# Patient Record
Sex: FEMALE | Race: WHITE
[De-identification: names, ages, dates, MRNs, and addresses within clinical notes are randomized per-mention and may not be internally consistent; named-entity substitution may affect disease eponyms.]

---

## 2018-01-27 ENCOUNTER — HOSPITAL ENCOUNTER (INPATIENT)
Dept: HOSPITAL 36 - ER | Age: 83
LOS: 5 days | Discharge: HOME | DRG: 871 | End: 2018-02-01
Payer: MEDICARE

## 2018-01-27 VITALS — SYSTOLIC BLOOD PRESSURE: 97 MMHG | DIASTOLIC BLOOD PRESSURE: 51 MMHG

## 2018-01-27 DIAGNOSIS — Z93.1: ICD-10-CM

## 2018-01-27 DIAGNOSIS — R65.21: ICD-10-CM

## 2018-01-27 DIAGNOSIS — M81.0: ICD-10-CM

## 2018-01-27 DIAGNOSIS — J96.00: ICD-10-CM

## 2018-01-27 DIAGNOSIS — I25.10: ICD-10-CM

## 2018-01-27 DIAGNOSIS — E03.9: ICD-10-CM

## 2018-01-27 DIAGNOSIS — E78.5: ICD-10-CM

## 2018-01-27 DIAGNOSIS — E44.1: ICD-10-CM

## 2018-01-27 DIAGNOSIS — Z90.49: ICD-10-CM

## 2018-01-27 DIAGNOSIS — E83.51: ICD-10-CM

## 2018-01-27 DIAGNOSIS — E11.65: ICD-10-CM

## 2018-01-27 DIAGNOSIS — F39: ICD-10-CM

## 2018-01-27 DIAGNOSIS — E11.649: ICD-10-CM

## 2018-01-27 DIAGNOSIS — A41.9: Primary | ICD-10-CM

## 2018-01-27 DIAGNOSIS — D63.8: ICD-10-CM

## 2018-01-27 DIAGNOSIS — E87.6: ICD-10-CM

## 2018-01-27 DIAGNOSIS — Z86.73: ICD-10-CM

## 2018-01-27 DIAGNOSIS — G30.9: ICD-10-CM

## 2018-01-27 DIAGNOSIS — I11.9: ICD-10-CM

## 2018-01-27 DIAGNOSIS — E87.0: ICD-10-CM

## 2018-01-27 DIAGNOSIS — Z74.01: ICD-10-CM

## 2018-01-27 DIAGNOSIS — K25.4: ICD-10-CM

## 2018-01-27 DIAGNOSIS — G93.49: ICD-10-CM

## 2018-01-27 DIAGNOSIS — F02.80: ICD-10-CM

## 2018-01-27 DIAGNOSIS — G93.41: ICD-10-CM

## 2018-01-27 DIAGNOSIS — Z88.6: ICD-10-CM

## 2018-01-27 DIAGNOSIS — E86.0: ICD-10-CM

## 2018-01-27 DIAGNOSIS — K26.4: ICD-10-CM

## 2018-01-27 DIAGNOSIS — Z88.5: ICD-10-CM

## 2018-01-27 DIAGNOSIS — C43.9: ICD-10-CM

## 2018-01-27 DIAGNOSIS — S31.000A: ICD-10-CM

## 2018-01-27 DIAGNOSIS — J69.0: ICD-10-CM

## 2018-01-27 DIAGNOSIS — L89.152: ICD-10-CM

## 2018-01-27 DIAGNOSIS — M62.81: ICD-10-CM

## 2018-01-27 DIAGNOSIS — K56.41: ICD-10-CM

## 2018-01-27 DIAGNOSIS — F41.9: ICD-10-CM

## 2018-01-27 DIAGNOSIS — R13.10: ICD-10-CM

## 2018-01-27 DIAGNOSIS — G82.20: ICD-10-CM

## 2018-01-27 LAB
ALBUMIN SERPL-MCNC: 3.3 GM/DL (ref 3.7–5.3)
ALBUMIN SERPL-MCNC: 3.5 GM/DL (ref 3.7–5.3)
ALBUMIN/GLOB SERPL: 1.2 {RATIO} (ref 1–1.8)
ALBUMIN/GLOB SERPL: 1.3 {RATIO} (ref 1–1.8)
ALP SERPL-CCNC: 131 U/L (ref 34–104)
ALP SERPL-CCNC: 134 U/L (ref 34–104)
ALT SERPL-CCNC: 36 U/L (ref 7–52)
ALT SERPL-CCNC: 37 U/L (ref 7–52)
ANION GAP SERPL CALC-SCNC: 10.8 MMOL/L (ref 7–16)
ANION GAP SERPL CALC-SCNC: 15.8 MMOL/L (ref 7–16)
APPEARANCE UR: CLEAR
AST SERPL-CCNC: 43 U/L (ref 13–39)
AST SERPL-CCNC: 46 U/L (ref 13–39)
BACTERIA #/AREA URNS HPF: (no result) /HPF
BASOPHILS # BLD AUTO: 0 TH/CUMM (ref 0–0.2)
BASOPHILS # BLD AUTO: 0 TH/CUMM (ref 0–0.2)
BASOPHILS NFR BLD AUTO: 0.1 % (ref 0–2)
BASOPHILS NFR BLD AUTO: 0.1 % (ref 0–2)
BILIRUB DIRECT SERPL-MCNC: 0.1 MG/DL (ref 0–0.2)
BILIRUB SERPL-MCNC: 0.3 MG/DL (ref 0.3–1)
BILIRUB SERPL-MCNC: 0.3 MG/DL (ref 0.3–1)
BILIRUB UR-MCNC: NEGATIVE MG/DL
BUN SERPL-MCNC: 63 MG/DL (ref 7–25)
BUN SERPL-MCNC: 91 MG/DL (ref 7–25)
CALCIUM SERPL-MCNC: 10.1 MG/DL (ref 8.6–10.3)
CALCIUM SERPL-MCNC: 7.8 MG/DL (ref 8.6–10.3)
CHLORIDE SERPL-SCNC: 109 MEQ/L (ref 98–107)
CHLORIDE SERPL-SCNC: 117 MEQ/L (ref 98–107)
CHOLEST SERPL-MCNC: 96 MG/DL (ref ?–200)
CO2 SERPL-SCNC: 23.8 MEQ/L (ref 21–31)
CO2 SERPL-SCNC: 30.1 MEQ/L (ref 21–31)
COLOR UR: YELLOW
CREAT SERPL-MCNC: 0.7 MG/DL (ref 0.6–1.2)
CREAT SERPL-MCNC: 0.9 MG/DL (ref 0.6–1.2)
EOSINOPHIL # BLD AUTO: 0.1 TH/CMM (ref 0.1–0.4)
EOSINOPHIL # BLD AUTO: 0.1 TH/CMM (ref 0.1–0.4)
EOSINOPHIL NFR BLD AUTO: 0.9 % (ref 0–5)
EOSINOPHIL NFR BLD AUTO: 2.3 % (ref 0–5)
EPI CELLS URNS QL MICRO: (no result) /LPF
ERYTHROCYTE [DISTWIDTH] IN BLOOD BY AUTOMATED COUNT: 16.3 % (ref 11.5–20)
ERYTHROCYTE [DISTWIDTH] IN BLOOD BY AUTOMATED COUNT: 19.2 % (ref 11.5–20)
GLOBULIN SER-MCNC: 2.6 GM/DL
GLOBULIN SER-MCNC: 3 GM/DL
GLUCOSE SERPL-MCNC: 143 MG/DL (ref 70–105)
GLUCOSE SERPL-MCNC: 253 MG/DL (ref 70–105)
GLUCOSE UR STRIP-MCNC: NEGATIVE MG/DL
HCT VFR BLD CALC: 21.1 % (ref 41–60)
HCT VFR BLD CALC: 24.1 % (ref 41–60)
HDLC SERPL-MCNC: 27 MG/DL (ref 23–92)
HGB BLD-MCNC: 6.6 GM/DL (ref 12–16)
HGB BLD-MCNC: 7.5 GM/DL (ref 12–16)
KETONES UR STRIP-MCNC: NEGATIVE MG/DL
LEUKOCYTE ESTERASE UR-ACNC: NEGATIVE
LYMPHOCYTE AB SER FC-ACNC: 1.1 TH/CMM (ref 1.5–3)
LYMPHOCYTE AB SER FC-ACNC: 1.2 TH/CMM (ref 1.5–3)
LYMPHOCYTES NFR BLD AUTO: 16.6 % (ref 20–50)
LYMPHOCYTES NFR BLD AUTO: 17 % (ref 20–50)
MAGNESIUM SERPL-MCNC: 2.6 MG/DL (ref 1.9–2.7)
MCH RBC QN AUTO: 29.3 PG (ref 27–31)
MCH RBC QN AUTO: 29.4 PG (ref 27–31)
MCHC RBC AUTO-ENTMCNC: 31.1 PG (ref 28–36)
MCHC RBC AUTO-ENTMCNC: 31.3 PG (ref 28–36)
MCV RBC AUTO: 93.9 FL (ref 81–100)
MCV RBC AUTO: 94.1 FL (ref 81–100)
MICRO URNS: YES
MONOCYTES # BLD AUTO: 0.4 TH/CMM (ref 0.3–1)
MONOCYTES # BLD AUTO: 0.6 TH/CMM (ref 0.3–1)
MONOCYTES NFR BLD AUTO: 6.6 % (ref 2–10)
MONOCYTES NFR BLD AUTO: 7.4 % (ref 2–10)
NEUTROPHILS # BLD: 4.6 TH/CMM (ref 1.8–8)
NEUTROPHILS # BLD: 5.6 TH/CMM (ref 1.8–8)
NEUTROPHILS NFR BLD AUTO: 74 % (ref 40–80)
NEUTROPHILS NFR BLD AUTO: 75 % (ref 40–80)
NITRITE UR QL STRIP: NEGATIVE
PH UR STRIP: 7 [PH] (ref 4.6–8)
PLATELET # BLD: 191 TH/CMM (ref 150–400)
PLATELET # BLD: 281 TH/CMM (ref 150–400)
PMV BLD AUTO: 9.3 FL
PMV BLD AUTO: 9.8 FL
POTASSIUM SERPL-SCNC: 3.6 MEQ/L (ref 3.5–5.1)
POTASSIUM SERPL-SCNC: 3.9 MEQ/L (ref 3.5–5.1)
PROT UR STRIP-MCNC: NEGATIVE MG/DL
RBC # BLD AUTO: 2.24 MIL/CMM (ref 3.8–5.2)
RBC # BLD AUTO: 2.56 MIL/CMM (ref 3.8–5.2)
RBC # UR STRIP: NEGATIVE /UL
RBC #/AREA URNS HPF: (no result) /HPF (ref 0–5)
SODIUM SERPL-SCNC: 148 MEQ/L (ref 136–145)
SODIUM SERPL-SCNC: 151 MEQ/L (ref 136–145)
SP GR UR STRIP: 1.01 (ref 1–1.03)
TRIGL SERPL-MCNC: 245 MG/DL (ref ?–150)
URINALYSIS COMPLETE PNL UR: (no result)
UROBILINOGEN UR STRIP-ACNC: 0.2 E.U./DL (ref 0.2–1)
WBC # BLD AUTO: 6.2 TH/CMM (ref 4.8–10.8)
WBC # BLD AUTO: 7.5 TH/CMM (ref 4.8–10.8)
WBC #/AREA URNS HPF: (no result) /HPF (ref 0–5)

## 2018-01-27 PROCEDURE — C9113 INJ PANTOPRAZOLE SODIUM, VIA: HCPCS

## 2018-01-27 PROCEDURE — Z7610: HCPCS

## 2018-01-27 PROCEDURE — 30233N1 TRANSFUSION OF NONAUTOLOGOUS RED BLOOD CELLS INTO PERIPHERAL VEIN, PERCUTANEOUS APPROACH: ICD-10-PCS

## 2018-01-27 PROCEDURE — X6452: HCPCS

## 2018-01-27 PROCEDURE — P9016 RBC LEUKOCYTES REDUCED: HCPCS

## 2018-01-27 RX ADMIN — INSULIN ASPART SCH: 100 INJECTION, SOLUTION INTRAVENOUS; SUBCUTANEOUS at 11:45

## 2018-01-27 RX ADMIN — DEXTROSE AND SODIUM CHLORIDE SCH MLS/HR: 5; .45 INJECTION, SOLUTION INTRAVENOUS at 21:35

## 2018-01-27 RX ADMIN — INSULIN ASPART SCH: 100 INJECTION, SOLUTION INTRAVENOUS; SUBCUTANEOUS at 16:49

## 2018-01-27 RX ADMIN — INSULIN ASPART SCH UNITS: 100 INJECTION, SOLUTION INTRAVENOUS; SUBCUTANEOUS at 21:04

## 2018-01-27 NOTE — HISTORY & PHYSICAL
ADMIT DATE:  01/27/2018



CHIEF COMPLAINT:  Diabetes, out of control and severe decline.



HISTORY OF PRESENT ILLNESS:  The patient is an 84-year-old female, who is a

patient of mine at skilled nursing facility.  She has a history of diabetes

along with dysphagia, anxiety disorder, mood disorder and dementia and

dyslipidemia.  She was found to be extremely lethargic at the facility.  Her

blood sugars were over 500s and she declined as far as her mood and overall she

started to become very weak.  She was sent to the hospital where she was found

to be in sepsis.



PAST MEDICAL HISTORY:  Significant for dyslipidemia, coronary artery disease,

diabetes, and dementia.



FAMILY HISTORY:  Noncontributory.



ALLERGIES:  She is allergic to hydromorphone, morphine, and promethazine.



SURGICAL HISTORY:  She does have a history of cardiac surgery in the past.



MEDICATIONS:  All medications are reviewed and reconciled.  She is mostly n.p.o.

right now.  She does have a history of G-tube.



REVIEW OF SYSTEMS:

GENERAL:  Positive for recent fatigue, worsening confusion, and overall

lethargy.

HEENT:  No recent head trauma, change in vision, taste, hearing, or smell. 

Oral, no recent pain or discharge.

NECK:  No recent tracheal deviation.

CARDIOVASCULAR:  She has history of hypertension and coronary artery disease.

SKIN:  No recent rashes.

PSYCHIATRIC:  She has history of depression and mood disorder.

NEUROLOGIC:  No history of recent stroke or seizure.

MUSCULOSKELETAL:  Positive history of unsteady gait.

RESPIRATORY:  No history of recent COPD exacerbation or asthma.



PHYSICAL EXAMINATION:

VITAL SIGNS:  Temperature 97.9 degrees, heart rate is _____, respirations are

16, blood pressure 87/39 and currently in no pain.

GENERAL:  No acute distress.  She is not awake or alert.  She does awaken to

physical stimulus, but goes back to sleep.

HEENT:  No acute issues.

NECK:  Trachea is midline.  No JVD.

CARDIOVASCULAR:  She is in sinus tachycardia.

ABDOMEN:  Nontender, nondistended.

RESPIRATORY:  Decreased breath sounds bilaterally.

PSYCHIATRIC:  No psychosis or hallucinations.

NEUROLOGIC:  She is unable to participate.

EXTREMITIES:  There is no edema.



LABORATORY DATA:  Sodium 151, potassium 3.9, chloride 109, bicarb 30.1, BUN 91,

creatinine 0.9.  Lactic acid is 3.71.  BNP is 118.  White count is 7.5,

hemoglobin 6.6, and platelet count is 281,000.



ASSESSMENT:

1.  Septic shock.

2.  Hypovolemic hypernatremia.

3.  Diabetes mellitus, out of control.

4.  Mild malnutrition.

5.  Metabolic encephalopathy.

6.  Coronary artery disease.

7.  Dyslipidemia.

8.  Anemia of chronic illness.



PLAN:  The patient has been started on vancomycin and Zosyn.  Dr. Cox has been

consulted for ID and Dr. Mitchell Cox has been consulted for Pulmonary.  She has

been ordered one unit PRBC.  Follow up on blood cultures.  I have ordered a UA

as well.  Chest x-ray seems to be negative.  Continue fingerstick blood sugars. 

She will need aggressive hydration.  She is going to the ICU.  Prognosis is

poor.





DD: 01/27/2018 09:25

DT: 01/27/2018 10:37

[facility]# [id number]  [id number]

## 2018-01-27 NOTE — DIAGNOSTIC IMAGING REPORT
CHEST X-RAY: AP view



INDICATION: Pneumonia



COMPARISON: None



FINDINGS: Chronic lung changes are seen with no focal consolidation or

effusions.  Heart size is normal.  Degenerative changes of the spine are

noted.



IMPRESSION:



Chronic lung changes with no focal consolidation identified.



Atherosclerotic vascular disease.

## 2018-01-27 NOTE — TRANSFER SUMMARY
DATE OF TRANSFER:  01/27/2018



ADDENDUM



FOLLOWUP ADDENDUM REPORT



I just got the lab results; sodium 151, potassium 3.9, chloride is 109, CO2 is

33.1, anion gap is 15.8, glucose is 253, and BUN is 91.  Albumin is 3.5.  AST is

46, ALT is 37, alkaline phosphatase is 134.  Lipid profile shows cholesterol is

96, triglycerides are 245, HDL is 27, and LDL is 40.  Magnesium is 2.6.  Lactic

acid is 3.7.  BNP is 118.  Albumin is 3.  The patient's neutrophils were 75.



FINAL DIAGNOSES:

1.  Septic shock with lactic acidosis.

2.  Prerenal azotemia.

3.  Anemia, most likely be secondary to either upper gastrointestinal bleeding. 

We will check the stool for occult blood and we will aspirate the gastric

contents from the G-tube and if needed upper endoscopy will be done and the

patient will get 2 units of blood.  The patient is getting 200 mL of fluid per

hour.  The patient might need at least 3 liters of fluid to my knowledge and

more as per Dr. Hess.  Blood transfusion has been started, one unit has been

given.  The patient might need another 2 units of blood minimum and magnesium

which is 2.6 will definitely fall; and antibiotic, I have told them to give the

patient as much as fast as possible.



The patient's other diagnoses are dementia, hypertension, hypothyroidism, and

all other diagnoses that were mentioned earlier holds true.  The patient's other

diagnoses includes dementia, muscle weakness and history of hypertension,

history of PCI.  The patient has G-tube in place and the patient had anemia

before, the patient had renal failure, but the BUN was less than 40 at that

time, little more than 35.



Dr. Hess was called, he is right here, kind enough to come right away and see

the patient and admit the patient under his service.  He will admit the patient

in the ICU and thanks to all my nurses here to help me to take care of this

patient.





DD: 01/27/2018 09:19

DT: 01/27/2018 09:54

JOB# [id number]  [id number]

## 2018-01-27 NOTE — ER PHYSICIAN DOCUMENTATION
DATE OF SERVICE:  2018



An 84-year-old female patient, triage nurse was Arik, the patient's YOB: 1933, allergy to MDX hydromorphone, MDX morphine, MDX

promethazine.  As to what these causes the patient is not known.  The patient

was seen by the paramedic, our triage nurse who took the vital signs. 

Temperature was 97.9, pulse of 109, respirations 14, blood pressure was low

81/39.  The patient was given 1000 mL of normal saline fluid to be running open.

 Height of 5 feet 5 inches, weight of 185 inches.



HISTORY OF PRESENT ILLNESS:  The history was obtained from the patient's nurse

directly from the place where she came that the patient is a known diabetic

patient, diabetic type 2.  She has dementia.  She has muscle weakness.  She has

evidence of dysphagia.  [facility], [facility] because the patient had

hyperglycemia.  Initial blood sugar was 489 mg percentage at 6 o'clock in the

morning, the patient was given 10 units of insulin, and after half an hour at

0630, repeated the blood sugar, it was 530, and hence by the instruction of the

patient's doctor, the patient was referred to this institution, and immediately

our nurse, Arik was kind enough to get the blood sugar that was done, it was 255

mg percentage.  I have also immediately went and examined the patient.  Oxygen

saturation of the patient was 97% and the patient has a G-tube feeding for which

she gets a feeding.



The patient is not in a position to give any history.  The patient brought in

some records from [facility], at which time she was admitted

on 2017.  At that time, the patient was admitted by Lokesh Rosenberg D.O., doctor of osteopathic doctor.  An 84-year-old female patient with CAD,

dementia, multiple CVA, and the patient has altered mental status.  The patient

was living at the nursing facility and the patient was brought to that hospital,

[facility].  The patient had a history of acute renal failure, elevated lactate

of 4, she was in septic status and the patient was given several boluses and

started on empiric antibiotics in the Emergency Room, and Dr. Rosenberg was covering

for Dr. Hess and advised that the patient to be admitted.  The patient's other

past history, this is taken from the records that I obtained from the other

hospital showing that the patient has coronary artery disease.  She is status

post PCI, PCI stands for percutaneous coronary intervention, that means

angioplasty, probably the patient has a stent, now it is almost 90+ percent, the

patient has a stent placement done.  The patient is known to have

cerebrovascular accident.  It seems the patient is bedbound.  The patient has

hypertensive heart disease, dementia, hypothyroidism, diabetes mellitus.



PAST SURGICAL HISTORY:  Includes history of cholecystectomy and . 

Insulin, the patient was normally getting 100 units of Levemir that is detemir

40 units subq at bedtime and coverage with 2, 4, 6, 8, 10 units from 150-202,

201-254, 251-306, 301-358, 351-410 units.  Over 401, call doctor.  It seems that

the patient's medications 3 months ago, the patient was taking the following

medications, which included melatonin 5 mg tablet, ferrous sulfate, potassium

chloride, Xalatan eyedrops, Lasix 20 mg, and memantine 10 mg p.o. b.i.d.



The patient was evaluated by me and the patient once again is not in a position

to give any further history.



REVIEW OF SYSTEMS:  Could not be obtained because the patient is completely

lethargic.  She has eyeballs, which are sunken.  She appears to be pale and she

has a wish of her healthcare directive living will saying that "my children,

grandchildren, and siblings including my prior spouse to give my goodbyes, I

would like a [employer] service with many flowers, and my children are to decide

my service details together.  My personal belongings are to be divided between

my children including my personal assets, money, ___.



PHYSICAL EXAMINATION:

GENERAL:  The patient appears to be of lethargic, only responding to painful

stimuli.  She appears to be pale.  Eyeballs are slightly bulging, but not

exophthalmus is present.

NECK:  Neck veins are not distended.  Carotids are normal, normal uplift without

any bruit.

EXTREMITIES:  No cyanosis, petechia, ecchymosis.  No edema over the legs.  Legs

have contractures.  Patient's plantars are responding in bidirectional fashion,

left one and right one sometimes is downgoing.  The patient has no edema, no

cyanosis, no petechia, ecchymosis.

MUSCULOSKELETAL:  On general physical exam, does not move any hands and there

are no tremors are noted.  There is no evidence of any external injury.  There

is no evidence of any fracture.  There is no evidence of any meningeal signs. 

There is no evidence of any lymphadenopathy.

CHEST:  Reveals trachea to be central, decrease in air entry in both lungs, and

few crackles heard in both lung fields, but once again markedly decreased air

entry, and despite telling loudly to the patient about taking a deep breath, she

was hardly able to take a deep breath, so will get a chest x-ray to follow the

patient's lung status.



The patient's TSH level in the past was 2.05, which was within normal limits. 

At one point, her BUN was 41 and creatinine was 0.73 and this was close to

2017, that means more than 1-1/2 months ago, she had prerenal azotemia and

her electrolytes showed sodium 138, potassium 4.4, chloride of 95, CO2 of

31,calcium 98, and BUN and creatinine ratio is high at 56.2.  ___ was 6.2,

protein level of 6.8, bilirubin direct was 0.10 and albumin was 3.6.  We ordered

EKG level, let me read the EKG for you, EKG shows normal sinus rhythm and there

is a left axis deviation, not definitely left anterior fascicular block is seen,

there are T-wave changes seen from V1, V2 are inverted and minor nonspecific ST

changes seen, could be compatible with ischemia, and anterior wall also looks

like there is almost evidence of low voltage, perhaps secondary to

hypothyroidism.



The patient on examination of the abdomen is soft, benign, and negative. 

Wearing a diaper.  The patient has a G-tube in place and liver and spleen not

enlarged.  No free fluid in the abdominal cavity.  Central nervous system is as

I mentioned earlier.  Genitourinary system, there is no tenderness at the

costovertebral angle noted.  The patient's history from other places was

reviewed and diagnosis with dementia and also will be mentioned the final

diagnosis.  There is no evidence of any peripheral vascular disease.  Peripheral

pulses are faint, but present.  Peripheral vascular disease may be present, but

no deep vein thrombophlebitis is seen.  No cellulitis is noted.  There is no

evidence of any fracture or bleeding, etc.



FINAL DIAGNOSES:  The patient has uncontrolled diabetes mellitus, most likely

secondary to infection, may be urinary tract infection, may be pneumonia, it is

more likely probability.  Other diagnoses includes the patient is comatose, the

patient has dehydration, the patient has prerenal azotemia in the past, so right

now also looks like she may be in prerenal azotemia, looks like the patient may

be in sepsis.  She has a history of cerebrovascular accident, history of

hypertension, but right now the blood pressure is 81, so she has hypotension. 

She is known to have hypothyroidism, but I do not see that she is getting any

thyroid medication.  She has dysphagia.  I am not sure why she has dysphagia. 

In the past, she has unsteady gait, but I do not think that the patient is

currently in a condition to walk and she has muscle weakness and dysphagia.  It

is probable that the patient may be having gastroesophageal reflux disease and

gastritis, so we will give the patient some Protonix 40 mg a day through the

G-tube and the patient will need admission to the hospital.  Other labs workup

has been ordered.  So, that is the final diagnoses and we will wait for the lab

results to come, once the lab results come, I will dictate the addendum report.



ADDENDUM



FOLLOWUP ADDENDUM REPORT



I just got the lab results; sodium 151, potassium 3.9, chloride is 109, CO2 is

33.1, anion gap is 15.8, glucose is 253, and BUN is 91.  Albumin is 3.5.  AST is

46, ALT is 37, alkaline phosphatase is 134.  Lipid profile shows cholesterol is

96, triglycerides are 245, HDL is 27, and LDL is 40.  Magnesium is 2.6.  Lactic

acid is 3.7.  BNP is 118.  Albumin is 3.  The patient's neutrophils were 75.



FINAL DIAGNOSES:

1.  Septic shock with lactic acidosis.

2.  Prerenal azotemia.

3.  Anemia, most likely be secondary to either upper gastrointestinal bleeding. 

We will check the stool for occult blood and we will aspirate the gastric

contents from the G-tube and if needed upper endoscopy will be done and the

patient will get 2 units of blood.  The patient is getting 200 mL of fluid per

hour.  The patient might need at least 3 liters of fluid to my knowledge and

more as per Dr. Hess.  Blood transfusion has been started, one unit has been

given.  The patient might need another 2 units of blood minimum and magnesium

which is 2.6 will definitely fall; and antibiotic, I have told them to give the

patient as much as fast as possible.



The patient's other diagnoses are dementia, hypertension, hypothyroidism, and

all other diagnoses that were mentioned earlier holds true.  The patient's other

diagnoses includes dementia, muscle weakness and history of hypertension,

history of PCI.  The patient has G-tube in place and the patient had anemia

before, the patient had renal failure, but the BUN was less than 40 at that

time, little more than 35.



Dr. Hess was called, he is right here, kind enough to come right away and see

the patient and admit the patient under his service.  He will admit the patient

in the ICU and thanks to all my nurses here to help me to take care of this

patient.





DD: 2018 08:46

DT: 2018 10:45

JOB# [id number]  [id number]

## 2018-01-28 LAB
ALBUMIN SERPL-MCNC: 2.6 GM/DL (ref 3.7–5.3)
ALBUMIN/GLOB SERPL: 1.1 {RATIO} (ref 1–1.8)
ALP SERPL-CCNC: 56 U/L (ref 34–104)
ALT SERPL-CCNC: 29 U/L (ref 7–52)
ANION GAP SERPL CALC-SCNC: 10.6 MMOL/L (ref 7–16)
AST SERPL-CCNC: 35 U/L (ref 13–39)
BASOPHILS # BLD AUTO: 0 TH/CUMM (ref 0–0.2)
BASOPHILS # BLD AUTO: 0 TH/CUMM (ref 0–0.2)
BASOPHILS # BLD AUTO: 0.1 TH/CUMM (ref 0–0.2)
BASOPHILS NFR BLD AUTO: 0.1 % (ref 0–2)
BASOPHILS NFR BLD AUTO: 0.1 % (ref 0–2)
BASOPHILS NFR BLD AUTO: 1.2 % (ref 0–2)
BILIRUB DIRECT SERPL-MCNC: 0.15 MG/DL (ref 0–0.2)
BILIRUB SERPL-MCNC: 0.4 MG/DL (ref 0.3–1)
BUN SERPL-MCNC: 39 MG/DL (ref 7–25)
CALCIUM SERPL-MCNC: 7.4 MG/DL (ref 8.6–10.3)
CHLORIDE SERPL-SCNC: 117 MEQ/L (ref 98–107)
CO2 SERPL-SCNC: 23.8 MEQ/L (ref 21–31)
CREAT SERPL-MCNC: 0.6 MG/DL (ref 0.6–1.2)
EOSINOPHIL # BLD AUTO: 0.1 TH/CMM (ref 0.1–0.4)
EOSINOPHIL # BLD AUTO: 0.1 TH/CMM (ref 0.1–0.4)
EOSINOPHIL # BLD AUTO: 0.2 TH/CMM (ref 0.1–0.4)
EOSINOPHIL NFR BLD AUTO: 2.4 % (ref 0–5)
EOSINOPHIL NFR BLD AUTO: 2.5 % (ref 0–5)
EOSINOPHIL NFR BLD AUTO: 3.5 % (ref 0–5)
ERYTHROCYTE [DISTWIDTH] IN BLOOD BY AUTOMATED COUNT: 22.8 % (ref 11.5–20)
ERYTHROCYTE [DISTWIDTH] IN BLOOD BY AUTOMATED COUNT: 23.7 % (ref 11.5–20)
ERYTHROCYTE [DISTWIDTH] IN BLOOD BY AUTOMATED COUNT: 24.2 % (ref 11.5–20)
GLOBULIN SER-MCNC: 2.3 GM/DL
GLUCOSE SERPL-MCNC: 260 MG/DL (ref 70–105)
HBA1C MFR BLD: 5.4 % (ref 4–6)
HCT VFR BLD CALC: 28.6 % (ref 41–60)
HCT VFR BLD CALC: 28.7 % (ref 41–60)
HCT VFR BLD CALC: 28.7 % (ref 41–60)
HGB BLD-MCNC: 10 G/DL (ref 4–35)
HGB BLD-MCNC: 8.9 GM/DL (ref 12–16)
HGB BLD-MCNC: 9.1 GM/DL (ref 12–16)
HGB BLD-MCNC: 9.3 GM/DL (ref 12–16)
IRON SERPL-MCNC: 17 UG/DL (ref 27–139)
LYMPHOCYTE AB SER FC-ACNC: 0.7 TH/CMM (ref 1.5–3)
LYMPHOCYTES NFR BLD AUTO: 12 % (ref 20–50)
LYMPHOCYTES NFR BLD AUTO: 12.4 % (ref 20–50)
LYMPHOCYTES NFR BLD AUTO: 15.4 % (ref 20–50)
MAGNESIUM SERPL-MCNC: 2.1 MG/DL (ref 1.9–2.7)
MCH RBC QN AUTO: 26.4 PG (ref 27–31)
MCH RBC QN AUTO: 26.9 PG (ref 27–31)
MCH RBC QN AUTO: 27.1 PG (ref 27–31)
MCHC RBC AUTO-ENTMCNC: 31.2 PG (ref 28–36)
MCHC RBC AUTO-ENTMCNC: 31.9 PG (ref 28–36)
MCHC RBC AUTO-ENTMCNC: 32.2 PG (ref 28–36)
MCV RBC AUTO: 84.2 FL (ref 81–100)
MCV RBC AUTO: 84.4 FL (ref 81–100)
MCV RBC AUTO: 84.5 FL (ref 81–100)
MONOCYTES # BLD AUTO: 0.2 TH/CMM (ref 0.3–1)
MONOCYTES # BLD AUTO: 0.2 TH/CMM (ref 0.3–1)
MONOCYTES # BLD AUTO: 0.3 TH/CMM (ref 0.3–1)
MONOCYTES NFR BLD AUTO: 3.5 % (ref 2–10)
MONOCYTES NFR BLD AUTO: 3.6 % (ref 2–10)
MONOCYTES NFR BLD AUTO: 6.1 % (ref 2–10)
NEUTROPHILS # BLD: 3.7 TH/CMM (ref 1.8–8)
NEUTROPHILS # BLD: 4.6 TH/CMM (ref 1.8–8)
NEUTROPHILS # BLD: 4.8 TH/CMM (ref 1.8–8)
NEUTROPHILS NFR BLD AUTO: 76 % (ref 40–80)
NEUTROPHILS NFR BLD AUTO: 79.8 % (ref 40–80)
NEUTROPHILS NFR BLD AUTO: 81.4 % (ref 40–80)
PCO2 BLDA: 36 MMHG (ref 35–45)
PLATELET # BLD: 169 TH/CMM (ref 150–400)
PLATELET # BLD: 172 TH/CMM (ref 150–400)
PLATELET # BLD: 184 TH/CMM (ref 150–400)
PMV BLD AUTO: 9 FL
PMV BLD AUTO: 9.4 FL
PMV BLD AUTO: 9.7 FL
PO2 BLDA: 110 MMHG (ref 80–100)
POTASSIUM SERPL-SCNC: 3.4 MEQ/L (ref 3.5–5.1)
RBC # BLD AUTO: 3.39 MIL/CMM (ref 3.8–5.2)
RBC # BLD AUTO: 3.39 MIL/CMM (ref 3.8–5.2)
RBC # BLD AUTO: 3.41 MIL/CMM (ref 3.8–5.2)
SAO2 % BLDA: 98 % (ref 92–100)
SODIUM SERPL-SCNC: 148 MEQ/L (ref 136–145)
TIBC SERPL-MCNC: 343 UG/DL (ref 250–450)
UIBC SERPL-MCNC: 326 UG/DL (ref 118–369)
WBC # BLD AUTO: 4.8 TH/CMM (ref 4.8–10.8)
WBC # BLD AUTO: 5.6 TH/CMM (ref 4.8–10.8)
WBC # BLD AUTO: 6 TH/CMM (ref 4.8–10.8)

## 2018-01-28 RX ADMIN — INSULIN ASPART SCH UNITS: 100 INJECTION, SOLUTION INTRAVENOUS; SUBCUTANEOUS at 17:33

## 2018-01-28 RX ADMIN — DEXTROSE AND SODIUM CHLORIDE SCH MLS/HR: 5; .45 INJECTION, SOLUTION INTRAVENOUS at 05:33

## 2018-01-28 RX ADMIN — INSULIN ASPART SCH UNITS: 100 INJECTION, SOLUTION INTRAVENOUS; SUBCUTANEOUS at 21:05

## 2018-01-28 RX ADMIN — DEXTROSE AND SODIUM CHLORIDE SCH MLS/HR: 5; .45 INJECTION, SOLUTION INTRAVENOUS at 23:56

## 2018-01-28 RX ADMIN — DEXTROSE AND SODIUM CHLORIDE SCH MLS/HR: 5; .45 INJECTION, SOLUTION INTRAVENOUS at 15:56

## 2018-01-28 RX ADMIN — INSULIN ASPART SCH: 100 INJECTION, SOLUTION INTRAVENOUS; SUBCUTANEOUS at 12:14

## 2018-01-28 RX ADMIN — INSULIN ASPART SCH UNITS: 100 INJECTION, SOLUTION INTRAVENOUS; SUBCUTANEOUS at 06:31

## 2018-01-28 NOTE — DIAGNOSTIC IMAGING REPORT
CHEST X-RAY: AP view



INDICATION: Shortness of breath



COMPARISON: Chest x-ray 1/27/2018



FINDINGS: Suboptimal lung volumes are noted.  There is right mid lung

density measuring up to 5 mm.  No focal consolidation or effusions. 

Heart size is normal.  Atherosclerosis is noted.



IMPRESSION:



No focal consolidation identified.



Indeterminate Right midlung density measuring up to 5 mm.  This may be

due to an area of scarring or chronic parenchymal process, however,

other pulmonary pathology and a small nodule cannot be excluded.  Please

correlate with older x-ray examinations, if available.  Alternative,

short-term follow-up CT chest with provide additional detail and

assessment.

## 2018-01-28 NOTE — PROGRESS NOTES
DATE:  01/27/2018



REASON FOR CONSULTATION:  Help critically sick patient with multisystem issues.



CONSULT NOTE:  This is an 84-year-old female who is a patient of Dr. Hess,

lives in a convalescent home, and the patient basically was admitted up here

because of:

1.  Altered state of mind.

2.  Sugar was very high.

3.  She was quite obtunded as well as some possibly sepsis.



Hence I was asked to see this patient for further care and necessary treatment. 

The patient is barely arousable, but meaningful information from the patient is

not available, and she is moaning and groaning, but otherwise unremarkable.  The

patient has been bedridden, has some form of chronic encephalopathy in the

patient including dementia, has a G-tube and also ha diabetic mellitus and

though meaningful history from the patient is not available, was found to have a

sugar of 500 plus with extremely weak, more decrease in alertness; hence the

patient was admitted for further care and necessary treatment.



PAST MEDICAL HISTORY:  History of dementia, history of Alzheimer's, history of

mood swings issues.  History of dyslipoproteinemia, history of coronary artery

disease.



ALLERGIC TO HYDROMORPHONE, MORPHINE, AND PROMETHAZINE.



SURGICAL HISTORY:  None at this particular time available



PHYSICAL EXAMINATION:

GENERAL:  This is an elderly looking female, appears to be chronically sick

looking, not in acute respiratory distress.

VITAL SIGNS:  The patient's recorded vitals are temperature is 96.6, blood

pressure is 105/56, and saturation 100% on 2 liters.

HEENT:  Head is essentially unremarkable.  Pupils seemingly reactive to light. 

Conjunctivae are quite pallor.  Oral cavity shows very poor dental hygiene. 

Throat could not be seen very well.

NECK:  Veins not visualized.

NECK:  No palpable lymphadenitis.

CHEST:  Shows diminished air entry.  Occasional secretory noise, otherwise

unremarkable.

ABDOMEN:  Shows slightly distended G-tube.

EXTREMITIES:  Shows some flexion contraction with some edematous changes in all

4 extremities.



LABORATORY DATA:  The patient's laboratory studies shows white count is 7.5,

hemoglobin 6.6, BUN is 91.  Lactic acid 4.3 with sugar at this time was 523, and

the patient's electrolytes show albumin is 3.3.  Triglycerides 245.  TSH is

8.43.  Urine does show fairly clear with a positive occult blood testing.



IMPRESSION:

1.  The patient has significant dehydration secondary to severe hypoglycemia,

ketone to be tested.

2.  Severe constipation.

3.  Severe anemia shows pattern is not clearcut.

4.  Underlying dementia with dyslipoproteinemia.



PLANS AND SUGGESTIONS:  Appreciate excellent management of Dr. Hess, will

continue current rate IV.  As the patient's CT indicates significant

constipation, ____ which will not be cleaned by tap water.  We will go for a

strong laxity from the top.  We agree with transfusion.  We will continue

sliding scale, will repeat some of the parameters back again tomorrow including

lactic acid and see how it is, and we will be glad to follow along with you.





DD: 01/27/2018 18:06

DT: 01/28/2018 02:31

JOB# [id number]  [id number]

## 2018-01-28 NOTE — PROGRESS NOTES
DATE:  01/28/2018



PULMONARY/CRITICAL CARE PROGRESS NOTE



PROBLEM LIST:

1.  Acute septicemia.

2.  Acute severe hypoglycemia.

3.  Constipation.

4.  Underlying history of diabetes mellitus and history of dementia.



SYMPTOMS:  Nil.  Feeling okay, though a little bit more verbal related to

yesterday.  According to RN, she had quite a big bowel movement and possibly

schedule for EGD tomorrow.



PHYSICAL EXAMINATION:

VITAL SIGNS:  Temperature is 97.1, blood pressure 106/56, saturation 100% on 2

liters.

NECK:  Veins not visualized.

CHEST:  Shows diminished air entry with occasional rhonchi.

ABDOMEN:  Much softer than yesterday.



LABORATORY DATA:  The patient's white count is 5.6, hemoglobin 9.1 after

transfusion and the patient's pO2 is 110 and this is on room air.  Sodium is

148, potassium is 3.3, and the patient's sugar is still labile.  Stool occult

blood one time is positive.



IMPRESSION:  The patient clinically appears to be stable, improving, though

septic source is not clearcut, and so far everything is negative including MRSA

of nares which has been negative.



PLANS AND SUGGESTIONS:  Okay with current treatment.  Start ____ once cleared by

nothing endoscopically and continue other treatment for the next 24-48 hours and

go from there.





DD: 01/28/2018 17:59

DT: 01/28/2018 22:30

JOB# [id number]  [id number]

## 2018-01-28 NOTE — DIAGNOSTIC IMAGING REPORT
CT abdomen and pelvis without intravenous contrast



Indication: Distended abdomen increased stool



Comparison: None,



Technique: Axial images were obtained from the lung bases to the

bilateral proximal femurs without IV contrast.  Coronal reconstructions

were made.  total DLP: 561,  CTDI11.8



FINDINGS:



Hypoventilatory changes of the lung bases are noted.  Assessment of the

solid organs is limited due to lack of IV contrast.  No evidence of

focal hepatic lesions.  There is mild haziness along the

pancreaticoduodenal region.  The patient is status post cholecystectomy.

 There is generalized mild prominence of the common bile duct measuring

up to 1 cm.  No focal splenic lesions.  Calcifications along the

pancreatic margin the likely due to splenic artery calcifications.  No

focal adrenal lesions.  No evidence of hydronephrosis or

nephrolithiasis.  Paige catheter and pockets of gas are seen within the

urinary bladder.  



There is severe distal fecal impaction with mass effect upon the urinary

bladder.  Mild diverticulosis is noted without evidence of

diverticulitis.  No evidence of appendicitis.  A percutaneous gastric

feeding tube is noted.  Diffuse atherosclerotic vascular disease of the

abdominal aorta and branches are noted.  No evidence of free air or free

fluid.  Degenerative changes of spine and pelvis are noted.  There is

Probable osteopenia.



IMPRESSION:



Severe distal fecal impaction with mass effect upon the urinary bladder.



Mild haziness along the pancreaticoduodenal margins.  Inflammatory

process in this region cannot be excluded.  Please correlate patient's

clinical findings and laboratory values.



Diverticulosis without evidence of diverticulitis.



Paige catheter pockets of gas in the urinary bladder.



Percutaneous gastric feeding tube noted.



Evidence of prior cholecystectomy.  There is mild nonspecific prominence

of the common bile duct may be related to patient's cholecystectomy

procedure.



Diffuse atherosclerotic vascular disease.

## 2018-01-28 NOTE — CONSULTATION
DATE OF CONSULTATION:  01/27/2018



REFERRING PHYSICIAN:  Dr. Hess.



REASON FOR CONSULTATION:  Sepsis, septic shock.



HISTORY OF PRESENT ILLNESS:  The patient is an 84-year-old female with a past

medical, coronary artery disease, status post replacement, dementia, diabetes

mellitus type 2, brought in from nursing facility for generalized weakness.  The

patient was also performed paraplegic.  On initial evaluation, the patient was

hypotensive and sepsis workup was performed.  Lactic acid was 4+.  The patient

was diagnosed with septic shock and was given in the ER and patient was started

on vancomycin and Zosyn.  The patient's blood pressure improved and ID consult

was called for further antibiotic management.



PAST MEDICAL HISTORY:  Diabetes mellitus type 2, dementia, anemia.  Uncontrolled

diabetes.



ALLERGIES:  HYDROMORPHONE, WARFARIN.



MEDICATIONS:  As per medication reconciliation sheet.  Antibiotic wise, the

patient is on vancomycin and Zosyn.  Review of systems were unable to obtain.



SOCIAL HISTORY:  The patient lives in a nursing facility.  No history of

smoking, alcohol or drug use.



FAMILY HISTORY:  Not available.



REVIEW OF SYSTEMS:  Unable to obtain, although the patient was hypotensive.  No

fever, no chills.  No cough, no shortness of breath.



PHYSICAL EXAMINATION:

VITAL SIGNS:  Shows temperature is 97.6, pulse 78, respirations 16, blood

pressure 115/55.  Oxygen saturation 100%.

GENERAL:  Appears uncomfortable, lying in bed.

HEENT:  Head is normocephalic, atraumatic.  Oral cavity moist.  Pink tongue. 

Eyes:  Pallor is present, no icterus.  Pupils PERRLA, EOMI.

NECK:  Supple, no JVD, no carotid bruit.  Trachea in midline.

CHEST:  Bilateral breath sounds.  No crackles or wheezing.

HEART:  S1, S2 within normal limits.  Regular rhythm.  No murmur, no gallop.

ABDOMEN:  Soft, nontender, nondistended.  Bowel sounds present.

EXTREMITIES:  No cyanosis, no edema.

NEUROLOGIC:  Unresponsive, quite lethargic.



LABORATORY DATA:  Current lab shows WBC count is 6200, hemoglobin 7.5,

hematocrit 24.1, platelets of 191,000.  Sodium 148, potassium 3.6, chloride is

117, bicarb 24, bun is 66, creatinine 0.7, glucose 143.  Lactic acid 1.28. 

Stool for occult blood culture is positive.  Blood cultures are pending.  Chest

x-ray is negative.



IMPRESSION:

1.  Septic shock, improving.

2.  Pneumonia, questionable.

3.  Diabetes mellitus type, uncontrolled.

4.  Dementia.

5.  Azotemia.



RECOMMENDATIONS:

1.  We will continue vancomycin and Zosyn.

2.  Followup on sepsis workup.





DD: 01/28/2018 02:15

DT: 01/28/2018 02:56

JOB# [id number]  [id number]

## 2018-01-28 NOTE — GENERAL PROGRESS NOTE
Subjective





- Review of Systems


Service Date: 18


Subjective: 





Pt seen and eval. She's waking up now and starting to talk. No n,v,d or cp. 


BP improved. Did not require any vasopressors.


No falls or sz. On iv fluids, now on D5-0.5ns. 


Stool OB positive. All anticoagulation is being held. GI is scheduling a scope.


No sz. Afebrile.


Status post 1 unit prbc, which was ordered in the ER. 


Has a GT, but no feedings right now.








Objective





- Results


Result Diagrams: 


 18 08:05





 18 08:05


Recent Labs: 


 Laboratory Last Values











WBC  5.6 Th/cmm (4.8-10.8)   18  08:05    


 


RBC  3.39 Mil/cmm (3.80-5.20)  L  18  08:05    


 


Hgb  9.1 gm/dL (12-16)  L  18  08:05    


 


Hct  28.6 % (41.0-60)  L  18  08:05    


 


MCV  84.5 fl ()   18  08:05    


 


MCH  26.9 pg (27.0-31.0)  L  18  08:05    


 


MCHC Differential  31.9 pg (28.0-36.0)   18  08:05    


 


RDW  22.8 % (11.5-20.0)  H  18  08:05    


 


Plt Count  169 Th/cmm (150-400)   18  08:05    


 


MPV  9.7 fl  18  08:05    


 


Neutrophils %  81.4 % (40.0-80.0)  H  18  08:05    


 


Lymphocytes %  12.4 % (20.0-50.0)  L  18  08:05    


 


Monocytes %  3.6 % (2.0-10.0)   18  08:05    


 


Eosinophils %  2.5 % (0.0-5.0)   18  08:05    


 


Basophils %  0.1 % (0.0-2.0)   18  08:05    


 


Specimen Source  Arterial   18  08:15    


 


Sample Site  RB   18  08:15    


 


pH  7.44  (7.35-7.45)   18  08:15    


 


pCO2  36.0 mmHg (35.0-45.0)   18  08:15    


 


pO2  110.0 mmHg (80.0-100.0)  H  18  08:15    


 


HCO3  25.4 mEq/L (20.0-26.0)   18  08:15    


 


Base Excess  0.6 mEq/L (-3.0-3.0)   18  08:15    


 


O2 Saturation  98.0 % (92.0-100.0)   18  08:15    


 


Carlos Test  NA   18  08:15    


 


Vent Rate  NA   18  08:15    


 


Inspired O2  21   18  08:15    


 


Tidal Volume  NA   18  08:15    


 


PEEP  NA   18  08:15    


 


Pressure (ins/psv/peep)  NA   18  08:15    


 


Critical Value  SH   18  08:15    


 


Sodium  148 mEq/L (136-145)  H  18  08:05    


 


Potassium  3.4 mEq/L (3.5-5.1)  L  18  08:05    


 


Chloride  117 mEq/L ()  H  18  08:05    


 


Carbon Dioxide  23.8 mEq/L (21.0-31.0)   18  08:05    


 


Anion Gap  10.6  (7.0-16.0)   18  08:05    


 


BUN  39 mg/dL (7-25)  H  18  08:05    


 


Creatinine  0.6 mg/dL (0.6-1.2)   18  08:05    


 


Est GFR ( Amer)  TNP   18  08:05    


 


Est GFR (Non-Af Amer)  TNP   18  08:05    


 


BUN/Creatinine Ratio  65.0   18  08:05    


 


Glucose  260 mg/dL ()  H D 18  08:05    


 


POC Glucose  275 MG/DL (70 - 105)  H  18  06:27    


 


Whole Bld Lactic Acid  1.91 mmol/L (0.60-1.99)   18  08:05    


 


Calcium  7.4 mg/dL (8.6-10.3)  L  18  08:05    


 


Magnesium  2.1 mg/dL (1.9-2.7)   18  08:05    


 


Iron  17 ug/dL ()  L  18  08:30    


 


TIBC  343 ug/dL (250-450)   18  08:30    


 


Iron Saturation  5 % (15-55)  L  18  08:30    


 


Unsaturated IBC  326 ug/dL (118-369)   18  08:30    


 


Total Bilirubin  0.4 mg/dL (0.3-1.0)   18  08:05    


 


Direct Bilirubin  0.15 mg/dL (0.0-0.2)   18  08:05    


 


AST  35 U/L (13-39)   18  08:05    


 


ALT  29 U/L (7-52)   18  08:05    


 


Alkaline Phosphatase  56 U/L ()   18  08:05    


 


Ammonia  57 umol/L (16-53)  H  18  08:05    


 


B-Natriuretic Peptide  118.0 pg/mL (5.0-100.0)  H  18  08:30    


 


Total Protein  4.9 gm/dL (6.0-8.3)  L  18  08:05    


 


Albumin  2.6 gm/dL (3.7-5.3)  L  18  08:05    


 


Globulin  2.3 gm/dL  18  08:05    


 


Albumin/Globulin Ratio  1.1  (1.0-1.8)   18  08:05    


 


Triglycerides  245 mg/dL (<150)  H  18  08:30    


 


Cholesterol  96 mg/dL (<200)   18  08:30    


 


LDL Cholesterol Direct  40 mg/dL ()  L  18  08:30    


 


HDL Cholesterol  27 mg/dL (23-92)   18  08:30    


 


Lipase  42 U/L (11-82)   18  08:05    


 


TSH  8.23 uIU/ml (0.34-5.60)  H  18  08:30    


 


Urine Source  CATH   18  09:45    


 


Urine Color  YELLOW   18  09:45    


 


Urine Clarity  CLEAR  (CLEAR)   18  09:45    


 


Urine pH  7.0  (4.6 - 8.0)   18  09:45    


 


Ur Specific Gravity  1.010  (1.005-1.030)   18  09:45    


 


Urine Protein  NEGATIVE mg/dL (NEGATIVE)   18  09:45    


 


Urine Glucose (UA)  NEGATIVE mg/dL (NEGATIVE)   18  09:45    


 


Urine Ketones  NEGATIVE mg/dL (NEGATIVE)   18  09:45    


 


Urine Blood  NEGATIVE  (NEGATIVE)   18  09:45    


 


Urine Nitrate  NEGATIVE  (NEGATIVE)   18  09:45    


 


Urine Bilirubin  NEGATIVE  (NEGATIVE)   18  09:45    


 


Urine Urobilinogen  0.2 E.U./dL (0.2 - 1.0)   18  09:45    


 


Ur Leukocyte Esterase  NEGATIVE  (NEGATIVE)   18  09:45    


 


Urine RBC  NONE SEEN /hpf (0-5)   18  09:45    


 


Urine WBC  NONE SEEN /hpf (0-5)   18  09:45    


 


Ur Epithelial Cells  NONE SEEN /lpf (FEW)   18  09:45    


 


Urine Bacteria  NONE SEEN /hpf (NONE SEEN)   18  09:45    


 


Stool Occult Blood  POSITIVE  (NEGATIVE)  H  18  11:00    


 


Blood Type  O POSITIVE   18  10:36    


 


Antibody Screen  NEGATIVE   18  10:36    


 


Crossmatch  See Detail   18  10:36    














- Physical Exam


Vitals and I&O: 


 Vital Signs











Temp  97.1 F   18 04:00


 


Pulse  66   18 06:00


 


Resp  16   18 06:00


 


BP  109/56   18 06:00


 


Pulse Ox  100   18 06:00








 Intake & Output











 18





 18:59 06:59 18:59


 


Intake Total 705.040 2341.833 


 


Output Total  1000 


 


Balance 833.333 482.833 


 


Weight (lbs)  61.235 kg 


 


Intake:   


 


  Intake, IV Amount 941.742 2473.833 


 


    D5-0.45NS 1,000 ml @ 125  995.833 





    mls/hr IV .Q8H [facility] Rx#:   





    [id number]   


 


    Pantoprazole 80 mg In  162 





    Sodium Chloride 0.9% 100   





    ml @ 10 mls/hr IV Q10H   





    [facility] Rx#:[id number]   


 


    Piperacillin Sodium/ 100 50 





    Tazobact 3.375 gm In   





    Sodium Chloride 0.9% 50   





    ml @ 100 mls/hr IV Q6HR   





    [facility] Rx#:[id number]   


 


    Sodium Chloride 0.45% 1, 733.333  





    000 ml @ 125 mls/hr IV .   





    Q8H [facility] Rx#:[id number]   


 


  Blood Product  275 


 


Output:   


 


  Urine  1000 


 


Other:   


 


  # Bowel Movements  2 


 


  Stool Characteristics Black Black 











Active Medications: 


Current Medications





Acetaminophen (Tylenol)  650 mg PO Q6H PRN


   PRN Reason: HEADACHE/TEMP ABOVE 100F


   Stop: 18 09:19


Magnesium Sulfate (Magnesium Sulfate Premix)  2 gm in 50 mls @ 25 mls/hr IV 

DAILY PRN


   PRN Reason: Magnesium level less than 1.6


   Stop: 18 09:19


Vancomycin HCl 1 gm/ Sodium (Chloride)  250 mls @ 165 mls/hr IV Q24H [facility]


   Stop: 18 08:59


   Last Admin: 18 09:11 Dose:  165 mls/hr


Piperacillin Sod/Tazobactam (Sod 3.375 gm/ Sodium Chloride)  50 mls @ 100 mls/

hr IV Q6HR [facility]


   Stop: 18 11:59


   Last Admin: 18 05:30 Dose:  100 mls/hr


Pantoprazole Sodium 80 mg/ (Sodium Chloride)  100 mls @ 10 mls/hr IV Q10H [facility]


   Stop: 18 14:59


   Last Infusion: 18 06:32 Dose:  10 mls/hr


Dextrose/Sodium Chloride (D5-0.45ns)  1,000 mls @ 125 mls/hr IV .Q8H [facility]


   Stop: 18 20:31


   Last Admin: 18 05:33 Dose:  125 mls/hr


Insulin Aspart (Novolog Insulin Sliding Scale)  0 units SUBQ ACHS BALTA


   PRN Reason: Protocol


   Stop: 18 11:29


   Last Admin: 18 06:31 Dose:  6 units


Magnesium Oxide (Mag-Oxide)  400 mg PO BID PRN


   PRN Reason: Mg less than 1.9


   Stop: 18 09:19


Miscellaneous (Vancomycin Iv Per Pharmacy)  1 ea  PRN PRN


   PRN Reason: PROTOCOL


   Stop: 18 09:17


Miscellaneous (Zosyn Iv Per Pharmacy)  1 ea  PRN PRN


   PRN Reason: PROTOCOL


   Stop: 18 09:17


Miscellaneous ([employer] Prophylaxis Screen/ Admission)  1 ea  PRN PRN


   PRN Reason: PROTOCOL


   Stop: 18 13:59


Ondansetron HCl (Zofran)  4 mg IVP Q6H PRN


   PRN Reason: Nausea / Vomiting


   Stop: 18 09:19


Pneumococcal Polyvalent Vaccine (Pneumovax)  0.5 ml IM .ONCE ONE


   Stop: 18 10:01








General: No acute distress


HEENT: Atraumatic, PERRLA, EOMI


Neck: Supple, no JVD


Cardiovascular: Regular rate, Normal S1, Normal S2


Lungs: Other (Decreased BS bl)


Abdomen: Bowel sounds, Soft, no Tender, no Hepatomegaly


Extremities: no Clubbing


Neurological: Sensation intact


Skin: no Rash, no Breakdown


Psych/Mental Status: Other (No pyshosis)





- Procedures


Procedures: 


 Procedures











Procedure Code Date


 


COLONOSCOPY AND BIOPSY 47422 12


 


COLONOSCOPY W/LESION REMOVAL 93446 12


 


EGD BIOPSY SINGLE/MULTIPLE 53390 12


 


ENDO RECTUM POLYPECTOMY 48.36 12


 


ENDOSC POLYPECTOMY OF LG INTEST 45.42 12


 


ESOPHAGOGASTRODUODENOSCOPY [EGD] W/CLOSED BIOPSY 45.16 12


 


LESION REMOVAL COLONOSCOPY 57918 12


 


PACKED CELL TRANSFUSION 99.04 12














Assessment/Plan





- Assessment


Assessment: 





Septic shock


Hypovolemic Hypernatremia


DM-OOC


Mild Manlut


Severe anemia with possible GI bleed


ME


CAD


Dyslipid








- Plan


Plan: 





PT seen by ID, GI, and Pulm.


On aggressive hydration. She's not on any vasopressors right now. 


GI scheduling EGD as stool OB is positive.


Pt had a colonoscopy in 2017 in [facility].


On Vanceo and Zosyn.


On FSBS and riss.


On iv d5-0.5ns and protonix drip.


FU on chem 7 and cbc. 


FU on cultures.





Nutritional Asmnt/Malnutr-PDOC





- Dietary Evaluation


Malnutrition Findings (Please click <Entered> for more info): 








Nutritional Asmnt/Malnutrition                             Start:  18 13:

06


Text:                                                      Status: Complete    

  


Freq:                                                                          

  


 Document     18 13:06  JELANIITA  (Rec: 18 13:17  LETICIA  DUC-

FNS1)


 Nutritional Asmnt/Malnutrition


     Patient General Information


      Nutritional Screening                      High Risk


                                                 Consult


      Diagnosis                                  Septic shock


      Pertinent Medical Hx/Surgical Hx           diabetes, dyslipidemia, CAD,


                                                 dementia


      Subjective Information                     Consult received for wound


                                                 present on admissino and


                                                 gris scale <12. Patient


                                                 admitted from [facility] with hyperglycemia


                                                 (489 BG, then 533). Patient


                                                 lying in bed at time of visit.


      Current Diet Order/ Nutrition Support      NPO


      Patient / S.O                              Not Indicated


      Pertinent Medications                      NOvolog, Mag-oxide, abx,


                                                 zofran


      Pertinent Labs                             : Na 151, BUN 91, glucose


                                                 139-255, AST 43, albumin 3.3,


                                                 


     Nutritional Hx/Data


      Height                                     1.65 m


      Height (Calculated Centimeters)            165.1


      Current Weight (lbs)                       61.235 kg


      Weight (Calculated Kilograms)              61.2


      Weight (Calculated Grams)                  10962.0


      Ideal Body Weight                          125


      % Ideal Body Weight                        108


      Body Mass Index (BMI)                      22.4


      Recent Weight Change                       No


      Weight Status                              Approriate


     GI Symptoms


      GI Symptoms                                None


      Last BM                                    None noted since admission


      Difficult in:                              None


      Food Allergies                             No


      Cultural/Ethnic/Zoroastrianism Belief           none indicated


      Skin Integrity/Comment:                    Gris


     Estimated Nutritional Goals


      BEE in Kcals:                              Using Current wt


      Calories/Kcals/Kg                          27-32 kcal/kg- using 61.3 kg


                                                 Current body weight- sepsis


      Kcals Calculated                           ~8932-0260 kcal/day


      Protein:                                   Using Current wt


      Protein g/k.2-1.5 gm/kg - Sepsis


      Protein Calculated                         ~75-92 gm/day


      Fluid: ml                                  ~6392-6393 ml/day (1 ml/kcal)


     Nutritional Problem


      2. Problem


       Problem                                   Altered nutrition related lab


                                                 values related to


       Etiology                                  uncontrolled hyperglycemia aeb


       Signs/Symptoms:                           glucose 139-255,


      1. Problem


       Problem                                   Inadequate energy intake


                                                 related to


       Etiology                                  withholding of nutrition at


                                                 this time with NPO diet order


                                                 aeb


       Signs/Symptoms:                           meeting <25% of estimated


                                                 nutrient needs.


     Intervention/Recommendation


      Comments                                   1. Due to hx dysphagia and


                                                 Gtube, when medically


                                                 appropriate, start


                                                 Diabetisource AC at goal of 55


                                                 ml/hr to provide 1320 ml


                                                 volume, 1584 kcal, 79 gm


                                                 protein.


                                                 2. Adjust insulin regimen per


                                                 MD for optimal glycemic


                                                 control.


     Expected Outcomes/Goals


      Expected Outcomes/Goals                    meets >75% of estimated


                                                 nutrient needs, improved skin


                                                 integrity, nutrition related


                                                 labs normalize,


                                                 F/U HR 2-3 days -

## 2018-01-29 LAB
ANION GAP SERPL CALC-SCNC: 9.9 MMOL/L (ref 7–16)
BASOPHILS # BLD AUTO: 0 TH/CUMM (ref 0–0.2)
BASOPHILS NFR BLD AUTO: 0.5 % (ref 0–2)
BUN SERPL-MCNC: 13 MG/DL (ref 7–25)
CALCIUM SERPL-MCNC: 6.7 MG/DL (ref 8.6–10.3)
CHLORIDE SERPL-SCNC: 115 MEQ/L (ref 98–107)
CO2 SERPL-SCNC: 21.4 MEQ/L (ref 21–31)
CREAT SERPL-MCNC: 0.6 MG/DL (ref 0.6–1.2)
EOSINOPHIL # BLD AUTO: 0.2 TH/CMM (ref 0.1–0.4)
EOSINOPHIL NFR BLD AUTO: 2.4 % (ref 0–5)
ERYTHROCYTE [DISTWIDTH] IN BLOOD BY AUTOMATED COUNT: 23.9 % (ref 11.5–20)
GLUCOSE SERPL-MCNC: 218 MG/DL (ref 70–105)
HCT VFR BLD CALC: 30.1 % (ref 41–60)
HGB BLD-MCNC: 9.6 GM/DL (ref 12–16)
INR PPP: 1.08 (ref 0.5–1.4)
LYMPHOCYTE AB SER FC-ACNC: 1.1 TH/CMM (ref 1.5–3)
LYMPHOCYTES NFR BLD AUTO: 16 % (ref 20–50)
MCH RBC QN AUTO: 26.9 PG (ref 27–31)
MCHC RBC AUTO-ENTMCNC: 31.9 PG (ref 28–36)
MCV RBC AUTO: 84.5 FL (ref 81–100)
MONOCYTES # BLD AUTO: 0.3 TH/CMM (ref 0.3–1)
MONOCYTES NFR BLD AUTO: 3.6 % (ref 2–10)
NEUTROPHILS # BLD: 5.4 TH/CMM (ref 1.8–8)
NEUTROPHILS NFR BLD AUTO: 77.5 % (ref 40–80)
PLATELET # BLD: 183 TH/CMM (ref 150–400)
PMV BLD AUTO: 9.1 FL
POTASSIUM SERPL-SCNC: 3.3 MEQ/L (ref 3.5–5.1)
PROTHROMBIN TIME: 11.2 SECONDS (ref 9.5–11.5)
RBC # BLD AUTO: 3.56 MIL/CMM (ref 3.8–5.2)
SODIUM SERPL-SCNC: 143 MEQ/L (ref 136–145)
WBC # BLD AUTO: 7 TH/CMM (ref 4.8–10.8)

## 2018-01-29 RX ADMIN — INSULIN ASPART SCH UNITS: 100 INJECTION, SOLUTION INTRAVENOUS; SUBCUTANEOUS at 22:49

## 2018-01-29 RX ADMIN — INSULIN ASPART SCH: 100 INJECTION, SOLUTION INTRAVENOUS; SUBCUTANEOUS at 11:21

## 2018-01-29 RX ADMIN — INSULIN ASPART SCH UNITS: 100 INJECTION, SOLUTION INTRAVENOUS; SUBCUTANEOUS at 16:42

## 2018-01-29 RX ADMIN — DEXTROSE AND SODIUM CHLORIDE SCH MLS/HR: 5; .45 INJECTION, SOLUTION INTRAVENOUS at 21:26

## 2018-01-29 RX ADMIN — DEXTROSE AND SODIUM CHLORIDE SCH MLS/HR: 5; .45 INJECTION, SOLUTION INTRAVENOUS at 10:00

## 2018-01-29 RX ADMIN — DEXTROSE AND SODIUM CHLORIDE SCH MLS/HR: 5; .45 INJECTION, SOLUTION INTRAVENOUS at 18:00

## 2018-01-29 RX ADMIN — INSULIN ASPART SCH UNITS: 100 INJECTION, SOLUTION INTRAVENOUS; SUBCUTANEOUS at 07:28

## 2018-01-29 NOTE — CONSULTATION
DATE OF CONSULTATION:  01/28/2018



INPATIENT GASTROINTESTINAL CONSULTATION



REASON FOR CONSULTATION:  Anemia and melena.



CONSULTING PHYSICIAN:  Dr. Hess.



HISTORY OF PRESENT ILLNESS:  The patient is an 84-year-old female with past

medical history significant for type 2 diabetes, dysphagia, anxiety disorder,

dementia, dyslipidemia, who was brought in from her skilled nursing facility

with weakness.  The patient was found to have blood sugars over 500 at the

nursing facility as well as being more acutely weak than her usual state and

thus she was brought into the Emergency Room.  Once there, she was found to be

hypotensive and has since been treated for septic shock with broad spectrum

antibiotics.  Additionally, admission lab values showed a hemoglobin of 6.8 and

the nurses report showed that she had a melanotic stool and thus GI is asked for

evaluation.  Of note, the patient's family reports she had a colonoscopy

performed in 09/2017 at [facility] and was told that she had polyps and

hemorrhoids, but an EGD was not done there.



PAST MEDICAL HISTORY:  Dyslipidemia, coronary artery disease, type 2 diabetes,

dementia, anxiety disorder.



PAST SURGICAL HISTORY:  Unknown at this time.



FAMILY HISTORY:  Noncontributory.



ALLERGIES:  Reports an allergy to hydromorphone, morphine, and promethazine.



REVIEW OF SYSTEMS:  Not possible given the patient's reduced mental status at

this time.



SOCIAL HISTORY:  No documented history of alcoholism or illicit drug use.  She

is a resident of a nursing facility.



CURRENT MEDICATIONS:  Tylenol as needed, influenza vaccine, insulin as needed,

magnesium oxide, vancomycin, Zosyn, Zofran as needed, PPI, pantoprazole drip.



PHYSICAL EXAMINATION:

VITAL SIGNS:  Blood pressure is 109/56, pulse is 66 beats per minute,

respiratory rate of 16, oxygenation 100%.

GENERAL:  The patient is lying flat in bed.  She is alert and oriented x 0, no

apparent distress.

HEAD, EARS, EYES, NOSE, AND THROAT:  Normocephalic appearing head.  Pupils are

equal and reactive to light.  Extraocular muscles appear to be intact.  She has

temporal wasting.  Dry mucous membranes.

NECK:  Supple.  No JVD or thyromegaly or lymphadenopathy.

CHEST:  There are crackles at both bases.

CARDIOVASCULAR:  S1, S2 is present.  Regular rate and rhythm.

ABDOMEN:  Soft, nontender to palpation.  No obvious guarding, rebound, or

distention.

EXTREMITIES:  Frail appearing.  No pitting edema.  Pulses are palpable.

SKIN:  No obvious jaundice or rashes.



LABORATORY DATA:  White blood cell count 4.8; hemoglobin 8.9, on admission was

6.8; platelet count is 172.  Sodium 148, BUN 39, creatinine 0.6, total bilirubin

0.4, AST 35, ALT 29, albumin 2.6.



IMAGING STUDIES:  A CT abdomen and pelvis was performed and there was a

preliminary read that shows severe distal fecal impaction, diverticulosis,

status post cholecystectomy.  There is a prominent CBD, mild haziness along the

pancreaticoduodenal margin, questionable inflammatory process, and a Paige

catheter.



IMPRESSION:  This is an 84-year-old female with past medical history of

dementia, dysphagia with G-tube, anxiety disorder, coronary artery disease, who

is admitted to the hospital with weakness, found to have septic shock, and

anemia.

1.  Anemia.

2.  Septic shock.

3.  Fecal impaction.

4.  Melena.

5.  Dementia.

6.  Dysphagia with G-tube.



DISCUSSION:  The patient certainly appears to have presented hypotensive and in

septic shock and has responded to fluid resuscitation and antibiotics at this

point.  She additionally had elevated glucose levels as well as a low

hemoglobin.  There is a report of black and tarry colored stool and thus it is

feasible to consider an upper GI bleed in this instance.  Lavage of the G-tube

did not show any active bleeding at this time.  The patient has responded well

to 2 units of blood transfusion.  Nevertheless, an EGD is indicated here.  The

patient already had a colonoscopy late last year.  The son was concerned about a

lower GI process especially given that the stool was black and not bright red. 

In terms of the CT findings, she does have a fecal impaction as well as some

inflammatory process around the duodenum and pancreas.  This may represent

duodenitis, peptic ulcer disease, or even pancreatitis.



RECOMMENDATIONS:

1.  We will add on the lipase to evaluate for any pancreatitis.

2.  Continue broad-spectrum antibiotics.

3.  We will plan for EGD tomorrow to investigate upper GI bleed in the ICU.

4.  Continue PPI drip at this time.

5.  Cycle CBC and transfuse to keep hemoglobin above 7.

6.  LFTs are normal.  Thus, she is unlikely to have a biliary obstruction even

though the CT findings noted prominent CBD.  I will continue to follow.



Thank you for allowing me to participate in this patient's care.  Please call

with any further questions.





DD: 01/28/2018 08:47

DT: 01/29/2018 01:34

JOB# [id number]  [id number]

## 2018-01-29 NOTE — INFECTIOUS DISEASE PROG NOTE
Infectious Disease Subjective





- Review of Systems


Service Date: 18


Subjective: 





Patient's blood pressure dropped. Levophed was started. No fever.





Infectious Disease Objective





- Results


Result Diagrams: 


 18 07:50





 18 07:50


Recent Labs: 


 Laboratory Last Values











WBC  7.0 Th/cmm (4.8-10.8)   18  07:50    


 


RBC  3.56 Mil/cmm (3.80-5.20)  L  18  07:50    


 


Hgb  9.6 gm/dL (12-16)  L  18  07:50    


 


Hct  30.1 % (41.0-60)  L  18  07:50    


 


MCV  84.5 fl ()   18  07:50    


 


MCH  26.9 pg (27.0-31.0)  L  18  07:50    


 


MCHC Differential  31.9 pg (28.0-36.0)   18  07:50    


 


RDW  23.9 % (11.5-20.0)  H  18  07:50    


 


Plt Count  183 Th/cmm (150-400)   18  07:50    


 


MPV  9.1 fl  18  07:50    


 


Neutrophils %  77.5 % (40.0-80.0)   18  07:50    


 


Lymphocytes %  16.0 % (20.0-50.0)  L  18  07:50    


 


Monocytes %  3.6 % (2.0-10.0)   18  07:50    


 


Eosinophils %  2.4 % (0.0-5.0)   18  07:50    


 


Basophils %  0.5 % (0.0-2.0)   18  07:50    


 


PT  11.2 SECONDS (9.5-11.5)   18  07:50    


 


INR  1.08  (0.5-1.4)   18  07:50    


 


Specimen Source  Arterial   18  08:15    


 


Sample Site  RB   18  08:15    


 


pH  7.44  (7.35-7.45)   18  08:15    


 


pCO2  36.0 mmHg (35.0-45.0)   18  08:15    


 


pO2  110.0 mmHg (80.0-100.0)  H  18  08:15    


 


HCO3  25.4 mEq/L (20.0-26.0)   18  08:15    


 


Base Excess  0.6 mEq/L (-3.0-3.0)   18  08:15    


 


O2 Saturation  98.0 % (92.0-100.0)   18  08:15    


 


Carlos Test  NA   18  08:15    


 


Vent Rate  NA   18  08:15    


 


Inspired O2  21   18  08:15    


 


Tidal Volume  NA   18  08:15    


 


PEEP  NA   18  08:15    


 


Pressure (ins/psv/peep)  NA   18  08:15    


 


Critical Value  SH   18  08:15    


 


Sodium  143 mEq/L (136-145)   18  07:50    


 


Potassium  3.3 mEq/L (3.5-5.1)  L  18  07:50    


 


Chloride  115 mEq/L ()  H  18  07:50    


 


Carbon Dioxide  21.4 mEq/L (21.0-31.0)   18  07:50    


 


Anion Gap  9.9  (7.0-16.0)   18  07:50    


 


BUN  13 mg/dL (7-25)   18  07:50    


 


Creatinine  0.6 mg/dL (0.6-1.2)   18  07:50    


 


Est GFR ( Amer)  TNP   18  07:50    


 


Est GFR (Non-Af Amer)  TNP   18  07:50    


 


BUN/Creatinine Ratio  21.7   18  07:50    


 


Glucose  218 mg/dL ()  H  18  07:50    


 


POC Glucose  147 MG/DL (70 - 105)  H  18  11:20    


 


Hemoglobin A1c %  5.4 % (4.0-6.0)   18  08:30    


 


Whole Bld Lactic Acid  1.91 mmol/L (0.60-1.99)   18  08:05    


 


Calcium  6.7 mg/dL (8.6-10.3)  L  18  07:50    


 


Magnesium  2.1 mg/dL (1.9-2.7)   18  08:05    


 


Iron  17 ug/dL ()  L  18  08:30    


 


TIBC  343 ug/dL (250-450)   18  08:30    


 


Iron Saturation  5 % (15-55)  L  18  08:30    


 


Unsaturated IBC  326 ug/dL (118-369)   18  08:30    


 


Total Bilirubin  0.4 mg/dL (0.3-1.0)   18  08:05    


 


Direct Bilirubin  0.15 mg/dL (0.0-0.2)   18  08:05    


 


AST  35 U/L (13-39)   18  08:05    


 


ALT  29 U/L (7-52)   18  08:05    


 


Alkaline Phosphatase  56 U/L ()   18  08:05    


 


Ammonia  57 umol/L (16-53)  H  18  08:05    


 


B-Natriuretic Peptide  118.0 pg/mL (5.0-100.0)  H  18  08:30    


 


Total Protein  4.9 gm/dL (6.0-8.3)  L  18  08:05    


 


Albumin  2.6 gm/dL (3.7-5.3)  L  18  08:05    


 


Globulin  2.3 gm/dL  18  08:05    


 


Albumin/Globulin Ratio  1.1  (1.0-1.8)   18  08:05    


 


Triglycerides  245 mg/dL (<150)  H  18  08:30    


 


Cholesterol  96 mg/dL (<200)   18  08:30    


 


LDL Cholesterol Direct  40 mg/dL ()  L  18  08:30    


 


HDL Cholesterol  27 mg/dL (23-92)   18  08:30    


 


Lipase  42 U/L (11-82)   18  08:05    


 


TSH  8.23 uIU/ml (0.34-5.60)  H  18  08:30    


 


Urine Source  CATH   18  09:45    


 


Urine Color  YELLOW   18  09:45    


 


Urine Clarity  CLEAR  (CLEAR)   18  09:45    


 


Urine pH  7.0  (4.6 - 8.0)   18  09:45    


 


Ur Specific Gravity  1.010  (1.005-1.030)   18  09:45    


 


Urine Protein  NEGATIVE mg/dL (NEGATIVE)   18  09:45    


 


Urine Glucose (UA)  NEGATIVE mg/dL (NEGATIVE)   18  09:45    


 


Urine Ketones  NEGATIVE mg/dL (NEGATIVE)   18  09:45    


 


Urine Blood  NEGATIVE  (NEGATIVE)   18  09:45    


 


Urine Nitrate  NEGATIVE  (NEGATIVE)   18  09:45    


 


Urine Bilirubin  NEGATIVE  (NEGATIVE)   18  09:45    


 


Urine Urobilinogen  0.2 E.U./dL (0.2 - 1.0)   18  09:45    


 


Ur Leukocyte Esterase  NEGATIVE  (NEGATIVE)   18  09:45    


 


Urine RBC  NONE SEEN /hpf (0-5)   18  09:45    


 


Urine WBC  NONE SEEN /hpf (0-5)   18  09:45    


 


Ur Epithelial Cells  NONE SEEN /lpf (FEW)   18  09:45    


 


Urine Bacteria  NONE SEEN /hpf (NONE SEEN)   18  09:45    


 


Stool Occult Blood  POSITIVE  (NEGATIVE)  H  18  07:00    


 


Vancomycin Trough  11.0 ug/mL (10-20)   18  07:50    


 


Blood Type  O POSITIVE   18  10:36    


 


Antibody Screen  NEGATIVE   18  10:36    


 


Crossmatch  See Detail   18  10:36    














- Physical Exam


Vitals and I&O: 


 Vital Signs











Temp  97.4 F   18 12:00


 


Pulse  77   18 12:15


 


Resp  12   18 12:00


 


BP  97/48   18 12:15


 


Pulse Ox  100   18 12:00








 Intake & Output











 18





 18:59 06:59 18:59


 


Intake Total 5071.867 3512.083 568.75


 


Output Total 650 400 


 


Balance 964.723 9906.083 568.75


 


Weight (lbs) 61.235 kg 60.509 kg 


 


Intake:   


 


  Intake, IV Amount 3870.991 1784.083 568.75


 


    D5-0.45NS 1,000 ml @ 125 1000 1781.25 218.75





    mls/hr IV .Q8H [facility] Rx#:   





    [id number]   


 


    Pantoprazole 80 mg In 31.333 119.833 





    Sodium Chloride 0.9% 100   





    ml @ 10 mls/hr IV Q10H   





    [facility] Rx#:[id number]   


 


    Piperacillin Sodium/ 100 50 100





    Tazobact 3.375 gm In   





    Sodium Chloride 0.9% 50   





    ml @ 100 mls/hr IV Q6HR   





    [facility] Rx#:[id number]   


 


    Vancomycin HCl 1 gm In 250  250





    Sodium Chloride 0.9% 250   





    ml @ 165 mls/hr IV Q24H   





    [facility] Rx#:[id number]   


 


Output:   


 


  Urine 650 400 


 


Other:   


 


  # Bowel Movements 0  











Active Medications: 


Current Medications





Acetaminophen (Tylenol)  650 mg PO Q6H PRN


   PRN Reason: HEADACHE/TEMP ABOVE 100F


   Stop: 18 09:19


Magnesium Sulfate (Magnesium Sulfate Premix)  2 gm in 50 mls @ 25 mls/hr IV 

DAILY PRN


   PRN Reason: Magnesium level less than 1.6


   Stop: 18 09:19


Vancomycin HCl 1 gm/ Sodium (Chloride)  250 mls @ 165 mls/hr IV Q24H [facility]


   Stop: 18 08:59


   Last Infusion: 18 10:15 Dose:  Infused


Piperacillin Sod/Tazobactam (Sod 3.375 gm/ Sodium Chloride)  50 mls @ 100 mls/

hr IV Q6HR [facility]


   Stop: 18 11:59


   Last Infusion: 18 11:35 Dose:  Infused


Dextrose/Sodium Chloride (D5-0.45ns)  1,000 mls @ 125 mls/hr IV .Q8H [facility]


   Stop: 18 20:31


   Last Admin: 18 10:00 Dose:  125 mls/hr


Potassium Chloride 40 meq/Lidocaine HCl 25 mg/ Sodium Chloride  272.5 mls @ 

67.623 mls/hr IV ONCE ONE


   Stop: 18 14:01


   Last Admin: 18 10:20 Dose:  67.623 mls/hr


Norepinephrine Bitartrate 4 mg (/ Dextrose)  254 mls @ 15.24 mls/hr IV TITR PRN

; Protocol; 4 MCG/MIN


   PRN Reason: BP MAINTENANCE (PER PROTOCOL)


   Stop: 18 10:17


   Last Admin: 18 10:56 Dose:  4 mcg/min, 15.24 mls/hr


Insulin Aspart (Novolog Insulin Sliding Scale)  0 units SUBQ ACHS BALTA


   PRN Reason: Protocol


   Stop: 18 11:29


   Last Admin: 18 11:21 Dose:  Not Given


Magnesium Oxide (Mag-Oxide)  400 mg PO BID PRN


   PRN Reason: Mg less than 1.9


   Stop: 18 09:19


Miscellaneous (Vancomycin Iv Per Pharmacy)  1 [facility] PRN PRN


   PRN Reason: PROTOCOL


   Stop: 18 09:17


Miscellaneous (Zosyn Iv Per Pharmacy)  1 [facility] PRN PRN


   PRN Reason: PROTOCOL


   Stop: 18 09:17


Miscellaneous (Vte [employer] Prophylaxis Screen/ Admission)  1 [facility] PRN PRN


   PRN Reason: PROTOCOL


   Stop: 18 13:59


Ondansetron HCl (Zofran)  4 mg IVP Q6H PRN


   PRN Reason: Nausea / Vomiting


   Stop: 18 09:19


Pantoprazole Sodium (Protonix)  40 mg IVP BID BALTA


   Stop: 18 16:59








General: no acute distress, well developed, well nourished


HEENT: atraumatic, normocephalic, PERRLA, EOMI


Neck: supple, no thyromegaly


Cardiovascular: S1S2, regular


Lungs: clear to auscultation bilaterally, clear to percussion


Abdomen: soft, no tender, no distended


Extremities: no cyanosis, no clubbing


Neurological: awake, alert, oriented


Skin: intact





- Procedures


Procedures: 


 Procedures











Procedure Code Date


 


BLOOD TRANSFUSION SERVICE 69185 18


 


COLONOSCOPY AND BIOPSY 69217 12


 


COLONOSCOPY W/LESION REMOVAL 66269 12


 


EGD BIOPSY SINGLE/MULTIPLE 83971 12


 


ENDO RECTUM POLYPECTOMY 48.36 12


 


ENDOSC POLYPECTOMY OF LG INTEST 45.42 12


 


ESOPHAGOGASTRODUODENOSCOPY [EGD] W/CLOSED BIOPSY 45.16 12


 


LESION REMOVAL COLONOSCOPY 34652 12


 


PACKED CELL TRANSFUSION 99.04 12


 


TRANSFUSE NONAUT RED BLOOD CELLS IN PERIPH VEIN, PERC 04989B8 18














Infectious Disease Assmt/Plan





- Assessment


Assessment: 





 


1.  Septic shock, improving.


2.  Pneumonia, questionable.


3.  Diabetes mellitus type, uncontrolled.


4.  Dementia.


5.  Azotemia.


6. Sacral woundsstage 2 small, no sign of infection.





 








- Plan


Plan: 


1.  We will continue vancomycin and Zosyn.


2.  Followup on sepsis workup.


3.  Blood cs.


4. CXR.


5. 1 liter bolus.


6. IVF support.








Nutritional Asmnt/Malnutr-PDOC





- Dietary Evaluation


Malnutrition Findings (Please click <Entered> for more info): 








Nutritional Asmnt/Malnutrition                             Start:  18 13:

06


Text:                                                      Status: Complete    

  


Freq:                                                                          

  


 Document     18 13:06  LETICIA  (Rec: 18 13:17  LETICIA XAVIER-

FNS1)


 Nutritional Asmnt/Malnutrition


     Patient General Information


      Nutritional Screening                      High Risk


                                                 Consult


      Diagnosis                                  Septic shock


      Pertinent Medical Hx/Surgical Hx           diabetes, dyslipidemia, CAD,


                                                 dementia


      Subjective Information                     Consult received for wound


                                                 present on admissino and


                                                 gris scale <12. Patient


                                                 admitted from [facility] with hyperglycemia


                                                 (489 BG, then 533). Patient


                                                 lying in bed at time of visit.


      Current Diet Order/ Nutrition Support      NPO


      Patient / S.O                              Not Indicated


      Pertinent Medications                      NOvolog, Mag-oxide, abx,


                                                 zofran


      Pertinent Labs                             : Na 151, BUN 91, glucose


                                                 139-255, AST 43, albumin 3.3,


                                                 


     Nutritional Hx/Data


      Height                                     1.65 m


      Height (Calculated Centimeters)            165.1


      Current Weight (lbs)                       61.235 kg


      Weight (Calculated Kilograms)              61.2


      Weight (Calculated Grams)                  79873.0


      Ideal Body Weight                          125


      % Ideal Body Weight                        108


      Body Mass Index (BMI)                      22.4


      Recent Weight Change                       No


      Weight Status                              Approriate


     GI Symptoms


      GI Symptoms                                None


      Last BM                                    None noted since admission


      Difficult in:                              None


      Food Allergies                             No


      Cultural/Ethnic/M[employer]on Belief           none indicated


      Skin Integrity/Comment:                    Gris


     Estimated Nutritional Goals


      BEE in Kcals:                              Using Current wt


      Calories/Kcals/Kg                          27-32 kcal/kg- using 61.3 kg


                                                 Current body weight- sepsis


      Kcals Calculated                           ~8706-8719 kcal/day


      Protein:                                   Using Current wt


      Protein g/k.2-1.5 gm/kg - Sepsis


      Protein Calculated                         ~75-92 gm/day


      Fluid: ml                                  ~4279-1664 ml/day (1 ml/kcal)


     Nutritional Problem


      2. Problem


       Problem                                   Altered nutrition related lab


                                                 values related to


       Etiology                                  uncontrolled hyperglycemia aeb


       Signs/Symptoms:                           glucose 139-255,


      1. Problem


       Problem                                   Inadequate energy intake


                                                 related to


       Etiology                                  withholding of nutrition at


                                                 this time with NPO diet order


                                                 aeb


       Signs/Symptoms:                           meeting <25% of estimated


                                                 nutrient needs.


     Intervention/Recommendation


      Comments                                   1. Due to hx dysphagia and


                                                 Gtube, when medically


                                                 appropriate, start


                                                 Diabetisource AC at goal of 55


                                                 ml/hr to provide 1320 ml


                                                 volume, 1584 kcal, 79 gm


                                                 protein.


                                                 2. Adjust insulin regimen per


                                                 MD for optimal glycemic


                                                 control.


     Expected Outcomes/Goals


      Expected Outcomes/Goals                    meets >75% of estimated


                                                 nutrient needs, improved skin


                                                 integrity, nutrition related


                                                 labs normalize,


                                                 F/U HR 2-3 days -

## 2018-01-29 NOTE — GENERAL PROGRESS NOTE
Subjective





- Review of Systems


Service Date: 18


Subjective: 





Pt seen and eval. She's awake today and talking. Her youngest daughter is at 

the bedside.


 No n,v,d or cp. 


BP improved. Did not require any vasopressors.


No falls or sz. On iv fluids, now on D5-0.5ns. 


Stool OB positive. All anticoagulation is being held. GI scheduled EGD for .


No sz. Afebrile.


Status post 1 unit prbc, which was ordered in the ER. 


Has a GT, but no feedings right now.








Objective





- Results


Result Diagrams: 


 18 07:50





 18 07:50


Recent Labs: 


 Laboratory Last Values











WBC  7.0 Th/cmm (4.8-10.8)   18  07:50    


 


RBC  3.56 Mil/cmm (3.80-5.20)  L  18  07:50    


 


Hgb  9.6 gm/dL (12-16)  L  18  07:50    


 


Hct  30.1 % (41.0-60)  L  18  07:50    


 


MCV  84.5 fl ()   18  07:50    


 


MCH  26.9 pg (27.0-31.0)  L  18  07:50    


 


MCHC Differential  31.9 pg (28.0-36.0)   18  07:50    


 


RDW  23.9 % (11.5-20.0)  H  18  07:50    


 


Plt Count  183 Th/cmm (150-400)   18  07:50    


 


MPV  9.1 fl  18  07:50    


 


Neutrophils %  77.5 % (40.0-80.0)   18  07:50    


 


Lymphocytes %  16.0 % (20.0-50.0)  L  18  07:50    


 


Monocytes %  3.6 % (2.0-10.0)   18  07:50    


 


Eosinophils %  2.4 % (0.0-5.0)   18  07:50    


 


Basophils %  0.5 % (0.0-2.0)   18  07:50    


 


PT  11.2 SECONDS (9.5-11.5)   18  07:50    


 


INR  1.08  (0.5-1.4)   18  07:50    


 


Specimen Source  Arterial   18  08:15    


 


Sample Site  RB   18  08:15    


 


pH  7.44  (7.35-7.45)   18  08:15    


 


pCO2  36.0 mmHg (35.0-45.0)   18  08:15    


 


pO2  110.0 mmHg (80.0-100.0)  H  18  08:15    


 


HCO3  25.4 mEq/L (20.0-26.0)   18  08:15    


 


Base Excess  0.6 mEq/L (-3.0-3.0)   18  08:15    


 


O2 Saturation  98.0 % (92.0-100.0)   18  08:15    


 


Carlos Test  NA   18  08:15    


 


Vent Rate  NA   18  08:15    


 


Inspired O2  21   18  08:15    


 


Tidal Volume  NA   18  08:15    


 


PEEP  NA   18  08:15    


 


Pressure (ins/psv/peep)  NA   18  08:15    


 


Critical Value  SH   18  08:15    


 


Sodium  143 mEq/L (136-145)   18  07:50    


 


Potassium  3.3 mEq/L (3.5-5.1)  L  18  07:50    


 


Chloride  115 mEq/L ()  H  18  07:50    


 


Carbon Dioxide  21.4 mEq/L (21.0-31.0)   18  07:50    


 


Anion Gap  9.9  (7.0-16.0)   18  07:50    


 


BUN  13 mg/dL (7-25)   18  07:50    


 


Creatinine  0.6 mg/dL (0.6-1.2)   18  07:50    


 


Est GFR ( Amer)  TNP   18  07:50    


 


Est GFR (Non-Af Amer)  TNP   18  07:50    


 


BUN/Creatinine Ratio  21.7   18  07:50    


 


Glucose  218 mg/dL ()  H  18  07:50    


 


POC Glucose  229 MG/DL (70 - 105)  H  18  20:57    


 


Hemoglobin A1c %  5.4 % (4.0-6.0)   18  08:30    


 


Whole Bld Lactic Acid  1.91 mmol/L (0.60-1.99)   18  08:05    


 


Calcium  6.7 mg/dL (8.6-10.3)  L  18  07:50    


 


Magnesium  2.1 mg/dL (1.9-2.7)   18  08:05    


 


Iron  17 ug/dL ()  L  18  08:30    


 


TIBC  343 ug/dL (250-450)   18  08:30    


 


Iron Saturation  5 % (15-55)  L  18  08:30    


 


Unsaturated IBC  326 ug/dL (118-369)   18  08:30    


 


Total Bilirubin  0.4 mg/dL (0.3-1.0)   18  08:05    


 


Direct Bilirubin  0.15 mg/dL (0.0-0.2)   18  08:05    


 


AST  35 U/L (13-39)   18  08:05    


 


ALT  29 U/L (7-52)   18  08:05    


 


Alkaline Phosphatase  56 U/L ()   18  08:05    


 


Ammonia  57 umol/L (16-53)  H  18  08:05    


 


B-Natriuretic Peptide  118.0 pg/mL (5.0-100.0)  H  18  08:30    


 


Total Protein  4.9 gm/dL (6.0-8.3)  L  18  08:05    


 


Albumin  2.6 gm/dL (3.7-5.3)  L  18  08:05    


 


Globulin  2.3 gm/dL  18  08:05    


 


Albumin/Globulin Ratio  1.1  (1.0-1.8)   18  08:05    


 


Triglycerides  245 mg/dL (<150)  H  18  08:30    


 


Cholesterol  96 mg/dL (<200)   18  08:30    


 


LDL Cholesterol Direct  40 mg/dL ()  L  18  08:30    


 


HDL Cholesterol  27 mg/dL (23-92)   18  08:30    


 


Lipase  42 U/L (11-82)   18  08:05    


 


TSH  8.23 uIU/ml (0.34-5.60)  H  18  08:30    


 


Urine Source  CATH   18  09:45    


 


Urine Color  YELLOW   18  09:45    


 


Urine Clarity  CLEAR  (CLEAR)   18  09:45    


 


Urine pH  7.0  (4.6 - 8.0)   18  09:45    


 


Ur Specific Gravity  1.010  (1.005-1.030)   18  09:45    


 


Urine Protein  NEGATIVE mg/dL (NEGATIVE)   18  09:45    


 


Urine Glucose (UA)  NEGATIVE mg/dL (NEGATIVE)   18  09:45    


 


Urine Ketones  NEGATIVE mg/dL (NEGATIVE)   18  09:45    


 


Urine Blood  NEGATIVE  (NEGATIVE)   18  09:45    


 


Urine Nitrate  NEGATIVE  (NEGATIVE)   18  09:45    


 


Urine Bilirubin  NEGATIVE  (NEGATIVE)   18  09:45    


 


Urine Urobilinogen  0.2 E.U./dL (0.2 - 1.0)   18  09:45    


 


Ur Leukocyte Esterase  NEGATIVE  (NEGATIVE)   18  09:45    


 


Urine RBC  NONE SEEN /hpf (0-5)   18  09:45    


 


Urine WBC  NONE SEEN /hpf (0-5)   18  09:45    


 


Ur Epithelial Cells  NONE SEEN /lpf (FEW)   18  09:45    


 


Urine Bacteria  NONE SEEN /hpf (NONE SEEN)   18  09:45    


 


Stool Occult Blood  POSITIVE  (NEGATIVE)  H  18  07:00    


 


Vancomycin Trough  11.0 ug/mL (10-20)   18  07:50    


 


Blood Type  O POSITIVE   18  10:36    


 


Antibody Screen  NEGATIVE   18  10:36    


 


Crossmatch  See Detail   18  10:36    














- Physical Exam


Vitals and I&O: 


 Vital Signs











Temp  97 F   18 04:00


 


Pulse  68   18 07:14


 


Resp  9   18 07:14


 


BP  98/52   18 06:00


 


Pulse Ox  100   18 07:14








 Intake & Output











 01/18





 18:59 06:59 18:59


 


Intake Total 6922.626 9764.083 50


 


Output Total 650 400 


 


Balance 890.511 3789.083 50


 


Weight (lbs) 61.235 kg 60.509 kg 


 


Intake:   


 


  Intake, IV Amount 1874.496 3811.083 50


 


    D5-0.45NS 1,000 ml @ 125 1000 1781.25 





    mls/hr IV .Q8H [facility] Rx#:   





    [id number]   


 


    Pantoprazole 80 mg In 31.333 119.833 





    Sodium Chloride 0.9% 100   





    ml @ 10 mls/hr IV Q10H   





    [facility] Rx#:[id number]   


 


    Piperacillin Sodium/ 100 50 50





    Tazobact 3.375 gm In   





    Sodium Chloride 0.9% 50   





    ml @ 100 mls/hr IV Q6HR   





    [facility] Rx#:[id number]   


 


    Vancomycin HCl 1 gm In 250  





    Sodium Chloride 0.9% 250   





    ml @ 165 mls/hr IV Q24H   





    [facility] Rx#:[id number]   


 


Output:   


 


  Urine 650 400 


 


Other:   


 


  # Bowel Movements 0  











Active Medications: 


Current Medications





Acetaminophen (Tylenol)  650 mg PO Q6H PRN


   PRN Reason: HEADACHE/TEMP ABOVE 100F


   Stop: 18 09:19


Magnesium Sulfate (Magnesium Sulfate Premix)  2 gm in 50 mls @ 25 mls/hr IV 

DAILY PRN


   PRN Reason: Magnesium level less than 1.6


   Stop: 18 09:19


Vancomycin HCl 1 gm/ Sodium (Chloride)  250 mls @ 165 mls/hr IV Q24H [facility]


   Stop: 18 08:59


   Last Admin: 18 08:44 Dose:  165 mls/hr


Piperacillin Sod/Tazobactam (Sod 3.375 gm/ Sodium Chloride)  50 mls @ 100 mls/

hr IV Q6HR [facility]


   Stop: 18 11:59


   Last Infusion: 18 07:07 Dose:  Infused


Pantoprazole Sodium 80 mg/ (Sodium Chloride)  100 mls @ 10 mls/hr IV Q10H [facility]


   Stop: 18 14:59


   Last Infusion: 18 06:11 Dose:  10 mls/hr


Dextrose/Sodium Chloride (D5-0.45ns)  1,000 mls @ 125 mls/hr IV .Q8H [facility]


   Stop: 18 20:31


   Last Infusion: 18 06:11 Dose:  125 mls/hr


Insulin Aspart (Novolog Insulin Sliding Scale)  0 units SUBQ ACHS BALTA


   PRN Reason: Protocol


   Stop: 18 11:29


   Last Admin: 18 07:28 Dose:  6 units


Magnesium Oxide (Mag-Oxide)  400 mg PO BID PRN


   PRN Reason: Mg less than 1.9


   Stop: 18 09:19


Miscellaneous (Vancomycin Iv Per Pharmacy)  1 ea  PRN PRN


   PRN Reason: PROTOCOL


   Stop: 18 09:17


Miscellaneous (Zosyn Iv Per Pharmacy)  1 [facility] PRN PRN


   PRN Reason: PROTOCOL


   Stop: 18 09:17


Miscellaneous ([employer] Prophylaxis Screen/ Admission)  1 [facility] PRN PRN


   PRN Reason: PROTOCOL


   Stop: 18 13:59


Ondansetron HCl (Zofran)  4 mg IVP Q6H PRN


   PRN Reason: Nausea / Vomiting


   Stop: 18 09:19








General: No acute distress


HEENT: Atraumatic, PERRLA, EOMI


Neck: Supple, no JVD


Cardiovascular: Regular rate


Lungs: Other (Decreased BS bl)


Abdomen: Bowel sounds, Soft, no Tender, no Hepatomegaly


Extremities: no Clubbing


Neurological: Sensation intact


Skin: no Rash, no Breakdown


Psych/Mental Status: Other (No pyshosis)





- Procedures


Procedures: 


 Procedures











Procedure Code Date


 


BLOOD TRANSFUSION SERVICE 78624 18


 


COLONOSCOPY AND BIOPSY 03745 12


 


COLONOSCOPY W/LESION REMOVAL 41119 12


 


EGD BIOPSY SINGLE/MULTIPLE 91344 12


 


ENDO RECTUM POLYPECTOMY 48.36 12


 


ENDOSC POLYPECTOMY OF LG INTEST 45.42 12


 


ESOPHAGOGASTRODUODENOSCOPY [EGD] W/CLOSED BIOPSY 45.16 12


 


LESION REMOVAL COLONOSCOPY 88344 12


 


PACKED CELL TRANSFUSION 99.04 12


 


TRANSFUSE NONAUT RED BLOOD CELLS IN PERIPH VEIN, PERC 22025Q6 18














Assessment/Plan





- Assessment


Assessment: 





Septic shock


Hypovolemic Hypernatremia


DM-OOC


Mild Manlut


Severe anemia with possible GI bleed


ME


CAD


Dyslipid


Hypokalemia





- Plan


Plan: 





PT seen by ID, GI, and Pulm.


MORENA hong today on 18.


On aggressive hydration. She's not on any vasopressors right now. 


GI scheduling EGD as stool OB is positive.


Pt had a colonoscopy in 2017 in [facility].


On Vanceo and Zosyn.


On FSBS and riss.


On iv d5-0.5ns and protonix drip.


FU on chem 7 and cbc. 


FU on cultures.





Met with youngest daughter at bedside and answered all her questions.


Discussed care with RN. 


Will transfer pt out of the ICU today-top tele.


Fu on EGD which is scheduled for 18.








Nutritional Asmnt/Malnutr-PDOC





- Dietary Evaluation


Malnutrition Findings (Please click <Entered> for more info): 








Nutritional Asmnt/Malnutrition                             Start:  18 13:

06


Text:                                                      Status: Complete    

  


Freq:                                                                          

  


 Document     18 13:06  LETICIA  (Rec: 18 13:17  MMHILDA XAVIER

FNS1)


 Nutritional Asmnt/Malnutrition


     Patient General Information


      Nutritional Screening                      High Risk


                                                 Consult


      Diagnosis                                  Septic shock


      Pertinent Medical Hx/Surgical Hx           diabetes, dyslipidemia, CAD,


                                                 dementia


      Subjective Information                     Consult received for wound


                                                 present on admissino and


                                                 gris scale <12. Patient


                                                 admitted from [facility] with hyperglycemia


                                                 (489 BG, then 533). Patient


                                                 lying in bed at time of visit.


      Current Diet Order/ Nutrition Support      NPO


      Patient / S.O                              Not Indicated


      Pertinent Medications                      NOvolog, Mag-oxide, abx,


                                                 zofran


      Pertinent Labs                             : Na 151, BUN 91, glucose


                                                 139-255, AST 43, albumin 3.3,


                                                 


     Nutritional Hx/Data


      Height                                     1.65 m


      Height (Calculated Centimeters)            165.1


      Current Weight (lbs)                       61.235 kg


      Weight (Calculated Kilograms)              61.2


      Weight (Calculated Grams)                  37967.0


      Ideal Body Weight                          125


      % Ideal Body Weight                        108


      Body Mass Index (BMI)                      22.4


      Recent Weight Change                       No


      Weight Status                              Approriate


     GI Symptoms


      GI Symptoms                                None


      Last BM                                    None noted since admission


      Difficult in:                              None


      Food Allergies                             No


      Cultural/Ethnic/Congregation Belief           none indicated


      Skin Integrity/Comment:                    Gris


     Estimated Nutritional Goals


      BEE in Kcals:                              Using Current wt


      Calories/Kcals/Kg                          27-32 kcal/kg- using 61.3 kg


                                                 Current body weight- sepsis


      Kcals Calculated                           ~2877-0696 kcal/day


      Protein:                                   Using Current wt


      Protein g/k.2-1.5 gm/kg - Sepsis


      Protein Calculated                         ~75-92 gm/day


      Fluid: ml                                  ~9807-3158 ml/day (1 ml/kcal)


     Nutritional Problem


      2. Problem


       Problem                                   Altered nutrition related lab


                                                 values related to


       Etiology                                  uncontrolled hyperglycemia aeb


       Signs/Symptoms:                           glucose 139-255,


      1. Problem


       Problem                                   Inadequate energy intake


                                                 related to


       Etiology                                  withholding of nutrition at


                                                 this time with NPO diet order


                                                 aeb


       Signs/Symptoms:                           meeting <25% of estimated


                                                 nutrient needs.


     Intervention/Recommendation


      Comments                                   1. Due to hx dysphagia and


                                                 Gtube, when medically


                                                 appropriate, start


                                                 Diabetisource AC at goal of 55


                                                 ml/hr to provide 1320 ml


                                                 volume, 1584 kcal, 79 gm


                                                 protein.


                                                 2. Adjust insulin regimen per


                                                 MD for optimal glycemic


                                                 control.


     Expected Outcomes/Goals


      Expected Outcomes/Goals                    meets >75% of estimated


                                                 nutrient needs, improved skin


                                                 integrity, nutrition related


                                                 labs normalize,


                                                 F/U HR 2-3 days

## 2018-01-29 NOTE — GI PROGRESS NOTE
Subjective





- Review of Systems


Service Date: 01/29/18


Subjective: 


Hgb up > 9 today, no further melena. 





Objective





- Results


Result Diagrams: 


 01/29/18 07:50





 01/29/18 07:50


Recent Labs: 


 Laboratory Last Values











WBC  7.0 Th/cmm (4.8-10.8)   01/29/18  07:50    


 


RBC  3.56 Mil/cmm (3.80-5.20)  L  01/29/18  07:50    


 


Hgb  9.6 gm/dL (12-16)  L  01/29/18  07:50    


 


Hct  30.1 % (41.0-60)  L  01/29/18  07:50    


 


MCV  84.5 fl ()   01/29/18  07:50    


 


MCH  26.9 pg (27.0-31.0)  L  01/29/18  07:50    


 


MCHC Differential  31.9 pg (28.0-36.0)   01/29/18  07:50    


 


RDW  23.9 % (11.5-20.0)  H  01/29/18  07:50    


 


Plt Count  183 Th/cmm (150-400)   01/29/18  07:50    


 


MPV  9.1 fl  01/29/18  07:50    


 


Neutrophils %  77.5 % (40.0-80.0)   01/29/18  07:50    


 


Lymphocytes %  16.0 % (20.0-50.0)  L  01/29/18  07:50    


 


Monocytes %  3.6 % (2.0-10.0)   01/29/18  07:50    


 


Eosinophils %  2.4 % (0.0-5.0)   01/29/18  07:50    


 


Basophils %  0.5 % (0.0-2.0)   01/29/18  07:50    


 


PT  11.2 SECONDS (9.5-11.5)   01/29/18  07:50    


 


INR  1.08  (0.5-1.4)   01/29/18  07:50    


 


Specimen Source  Arterial   01/28/18  08:15    


 


Sample Site  RB   01/28/18  08:15    


 


pH  7.44  (7.35-7.45)   01/28/18  08:15    


 


pCO2  36.0 mmHg (35.0-45.0)   01/28/18  08:15    


 


pO2  110.0 mmHg (80.0-100.0)  H  01/28/18  08:15    


 


HCO3  25.4 mEq/L (20.0-26.0)   01/28/18  08:15    


 


Base Excess  0.6 mEq/L (-3.0-3.0)   01/28/18  08:15    


 


O2 Saturation  98.0 % (92.0-100.0)   01/28/18  08:15    


 


Carlos Test  NA   01/28/18  08:15    


 


Vent Rate  NA   01/28/18  08:15    


 


Inspired O2  21   01/28/18  08:15    


 


Tidal Volume  NA   01/28/18  08:15    


 


PEEP  NA   01/28/18  08:15    


 


Pressure (ins/psv/peep)  NA   01/28/18  08:15    


 


Critical Value  SH   01/28/18  08:15    


 


Sodium  143 mEq/L (136-145)   01/29/18  07:50    


 


Potassium  3.3 mEq/L (3.5-5.1)  L  01/29/18  07:50    


 


Chloride  115 mEq/L ()  H  01/29/18  07:50    


 


Carbon Dioxide  21.4 mEq/L (21.0-31.0)   01/29/18  07:50    


 


Anion Gap  9.9  (7.0-16.0)   01/29/18  07:50    


 


BUN  13 mg/dL (7-25)   01/29/18  07:50    


 


Creatinine  0.6 mg/dL (0.6-1.2)   01/29/18  07:50    


 


Est GFR ( Amer)  TNP   01/29/18  07:50    


 


Est GFR (Non-Af Amer)  TNP   01/29/18  07:50    


 


BUN/Creatinine Ratio  21.7   01/29/18  07:50    


 


Glucose  218 mg/dL ()  H  01/29/18  07:50    


 


POC Glucose  229 MG/DL (70 - 105)  H  01/28/18  20:57    


 


Hemoglobin A1c %  5.4 % (4.0-6.0)   01/27/18  08:30    


 


Whole Bld Lactic Acid  1.91 mmol/L (0.60-1.99)   01/28/18  08:05    


 


Calcium  6.7 mg/dL (8.6-10.3)  L  01/29/18  07:50    


 


Magnesium  2.1 mg/dL (1.9-2.7)   01/28/18  08:05    


 


Iron  17 ug/dL ()  L  01/27/18  08:30    


 


TIBC  343 ug/dL (250-450)   01/27/18  08:30    


 


Iron Saturation  5 % (15-55)  L  01/27/18  08:30    


 


Unsaturated IBC  326 ug/dL (118-369)   01/27/18  08:30    


 


Total Bilirubin  0.4 mg/dL (0.3-1.0)   01/28/18  08:05    


 


Direct Bilirubin  0.15 mg/dL (0.0-0.2)   01/28/18  08:05    


 


AST  35 U/L (13-39)   01/28/18  08:05    


 


ALT  29 U/L (7-52)   01/28/18  08:05    


 


Alkaline Phosphatase  56 U/L ()   01/28/18  08:05    


 


Ammonia  57 umol/L (16-53)  H  01/28/18  08:05    


 


B-Natriuretic Peptide  118.0 pg/mL (5.0-100.0)  H  01/27/18  08:30    


 


Total Protein  4.9 gm/dL (6.0-8.3)  L  01/28/18  08:05    


 


Albumin  2.6 gm/dL (3.7-5.3)  L  01/28/18  08:05    


 


Globulin  2.3 gm/dL  01/28/18  08:05    


 


Albumin/Globulin Ratio  1.1  (1.0-1.8)   01/28/18  08:05    


 


Triglycerides  245 mg/dL (<150)  H  01/27/18  08:30    


 


Cholesterol  96 mg/dL (<200)   01/27/18  08:30    


 


LDL Cholesterol Direct  40 mg/dL ()  L  01/27/18  08:30    


 


HDL Cholesterol  27 mg/dL (23-92)   01/27/18  08:30    


 


Lipase  42 U/L (11-82)   01/28/18  08:05    


 


TSH  8.23 uIU/ml (0.34-5.60)  H  01/27/18  08:30    


 


Urine Source  CATH   01/27/18  09:45    


 


Urine Color  YELLOW   01/27/18  09:45    


 


Urine Clarity  CLEAR  (CLEAR)   01/27/18  09:45    


 


Urine pH  7.0  (4.6 - 8.0)   01/27/18  09:45    


 


Ur Specific Gravity  1.010  (1.005-1.030)   01/27/18  09:45    


 


Urine Protein  NEGATIVE mg/dL (NEGATIVE)   01/27/18  09:45    


 


Urine Glucose (UA)  NEGATIVE mg/dL (NEGATIVE)   01/27/18  09:45    


 


Urine Ketones  NEGATIVE mg/dL (NEGATIVE)   01/27/18  09:45    


 


Urine Blood  NEGATIVE  (NEGATIVE)   01/27/18  09:45    


 


Urine Nitrate  NEGATIVE  (NEGATIVE)   01/27/18  09:45    


 


Urine Bilirubin  NEGATIVE  (NEGATIVE)   01/27/18  09:45    


 


Urine Urobilinogen  0.2 E.U./dL (0.2 - 1.0)   01/27/18  09:45    


 


Ur Leukocyte Esterase  NEGATIVE  (NEGATIVE)   01/27/18  09:45    


 


Urine RBC  NONE SEEN /hpf (0-5)   01/27/18  09:45    


 


Urine WBC  NONE SEEN /hpf (0-5)   01/27/18  09:45    


 


Ur Epithelial Cells  NONE SEEN /lpf (FEW)   01/27/18  09:45    


 


Urine Bacteria  NONE SEEN /hpf (NONE SEEN)   01/27/18  09:45    


 


Stool Occult Blood  POSITIVE  (NEGATIVE)  H  01/28/18  07:00    


 


Vancomycin Trough  11.0 ug/mL (10-20)   01/29/18  07:50    


 


Blood Type  O POSITIVE   01/27/18  10:36    


 


Antibody Screen  NEGATIVE   01/27/18  10:36    


 


Crossmatch  See Detail   01/27/18  10:36    














- Physical Exam


Vitals and I&O: 


 Vital Signs











Temp  97 F   01/29/18 04:00


 


Pulse  68   01/29/18 07:14


 


Resp  9   01/29/18 07:14


 


BP  98/52   01/29/18 06:00


 


Pulse Ox  100   01/29/18 07:14








 Intake & Output











 01/28/18 01/29/18 01/29/18





 18:59 06:59 18:59


 


Intake Total 0179.608 9473.083 50


 


Output Total 650 400 


 


Balance 595.757 8624.083 50


 


Weight (lbs) 61.235 kg 60.509 kg 


 


Intake:   


 


  Intake, IV Amount 1028.648 8828.083 50


 


    D5-0.45NS 1,000 ml @ 125 1000 1781.25 





    mls/hr IV .Q8H [facility] Rx#:   





    [id number]   


 


    Pantoprazole 80 mg In 31.333 119.833 





    Sodium Chloride 0.9% 100   





    ml @ 10 mls/hr IV Q10H   





    [facility] Rx#:[id number]   


 


    Piperacillin Sodium/ 100 50 50





    Tazobact 3.375 gm In   





    Sodium Chloride 0.9% 50   





    ml @ 100 mls/hr IV Q6HR   





    [facility] Rx#:[id number]   


 


    Vancomycin HCl 1 gm In 250  





    Sodium Chloride 0.9% 250   





    ml @ 165 mls/hr IV Q24H   





    [facility] Rx#:[id number]   


 


Output:   


 


  Urine 650 400 


 


Other:   


 


  # Bowel Movements 0  











Active Medications: 


Current Medications





Acetaminophen (Tylenol)  650 mg PO Q6H PRN


   PRN Reason: HEADACHE/TEMP ABOVE 100F


   Stop: 03/28/18 09:19


Magnesium Sulfate (Magnesium Sulfate Premix)  2 gm in 50 mls @ 25 mls/hr IV 

DAILY PRN


   PRN Reason: Magnesium level less than 1.6


   Stop: 03/28/18 09:19


Vancomycin HCl 1 gm/ Sodium (Chloride)  250 mls @ 165 mls/hr IV Q24H [facility]


   Stop: 03/29/18 08:59


   Last Infusion: 01/28/18 10:45 Dose:  Infused


Piperacillin Sod/Tazobactam (Sod 3.375 gm/ Sodium Chloride)  50 mls @ 100 mls/

hr IV Q6HR [facility]


   Stop: 03/28/18 11:59


   Last Infusion: 01/29/18 07:07 Dose:  Infused


Pantoprazole Sodium 80 mg/ (Sodium Chloride)  100 mls @ 10 mls/hr IV Q10H [facility]


   Stop: 03/28/18 14:59


   Last Infusion: 01/29/18 06:11 Dose:  10 mls/hr


Dextrose/Sodium Chloride (D5-0.45ns)  1,000 mls @ 125 mls/hr IV .Q8H [facility]


   Stop: 03/28/18 20:31


   Last Infusion: 01/29/18 06:11 Dose:  125 mls/hr


Insulin Aspart (Novolog Insulin Sliding Scale)  0 units SUBQ ACHS BALTA


   PRN Reason: Protocol


   Stop: 03/28/18 11:29


   Last Admin: 01/29/18 07:28 Dose:  6 units


Magnesium Oxide (Mag-Oxide)  400 mg PO BID PRN


   PRN Reason: Mg less than 1.9


   Stop: 03/28/18 09:19


Miscellaneous (Vancomycin Iv Per Pharmacy)  1 ea MC PRN PRN


   PRN Reason: PROTOCOL


   Stop: 03/28/18 09:17


Miscellaneous (Zosyn Iv Per Pharmacy)  1 ea MC PRN PRN


   PRN Reason: PROTOCOL


   Stop: 03/28/18 09:17


Miscellaneous (Vte [employer] Prophylaxis Screen/ Admission)  1 ea MC PRN PRN


   PRN Reason: PROTOCOL


   Stop: 03/28/18 13:59


Ondansetron HCl (Zofran)  4 mg IVP Q6H PRN


   PRN Reason: Nausea / Vomiting


   Stop: 03/28/18 09:19








General: No acute distress


HEENT: Atraumatic, PERRLA, EOMI


Neck: Supple, no JVD


Cardiovascular: Regular rate


Lungs: Other (Decreased BS bl)


Abdomen: Bowel sounds, Soft, no Tender, no Hepatomegaly


Extremities: no Clubbing


Neurological: Sensation intact


Skin: no Rash, no Breakdown


Psych/Mental Status: Other (No pyshosis)





- Procedures


Procedures: 


 Procedures











Procedure Code Date


 


BLOOD TRANSFUSION SERVICE 71848 01/27/18


 


COLONOSCOPY AND BIOPSY 29273 06/28/12


 


COLONOSCOPY W/LESION REMOVAL 31628 06/28/12


 


EGD BIOPSY SINGLE/MULTIPLE 96021 06/28/12


 


ENDO RECTUM POLYPECTOMY 48.36 06/28/12


 


ENDOSC POLYPECTOMY OF LG INTEST 45.42 06/28/12


 


ESOPHAGOGASTRODUODENOSCOPY [EGD] W/CLOSED BIOPSY 45.16 06/28/12


 


LESION REMOVAL COLONOSCOPY 75061 06/28/12


 


PACKED CELL TRANSFUSION 99.04 06/28/12


 


TRANSFUSE NONAUT RED BLOOD CELLS IN PERIPH VEIN, [facility] [id number] 01/27/18














Assessment/Plan





- Assessment


Assessment: 


# Anemia


# Fecal impaction


# Melena


# Septic shock


# Uncontrolled diabetes





Initial hgb was 6.8, and she has responded well to blood products without 

further melena. EGD was scheduled this morning, but OR not available. Thus, 

will schedule for tomorrow AM.





Colonoscopy already performed at [facility] in 9/2017, found polyps as .








Plan:





- EGD tomorrow morning 2/2 OR availability. Pt stable


- cont PPI


- can give feeds today, NPO midnight


- cycle CBCs, transfuse hgb > 7








Thank you for allowing me to participate in this patient's care, please call 

with any questions

## 2018-01-30 LAB
ANION GAP SERPL CALC-SCNC: 10.8 MMOL/L (ref 7–16)
BASOPHILS # BLD AUTO: 0 TH/CUMM (ref 0–0.2)
BASOPHILS NFR BLD AUTO: 0.3 % (ref 0–2)
BUN SERPL-MCNC: 8 MG/DL (ref 7–25)
CALCIUM SERPL-MCNC: 6.2 MG/DL (ref 8.6–10.3)
CHLORIDE SERPL-SCNC: 111 MEQ/L (ref 98–107)
CO2 SERPL-SCNC: 19.3 MEQ/L (ref 21–31)
CREAT SERPL-MCNC: 0.5 MG/DL (ref 0.6–1.2)
EOSINOPHIL # BLD AUTO: 0.1 TH/CMM (ref 0.1–0.4)
EOSINOPHIL NFR BLD AUTO: 1.8 % (ref 0–5)
ERYTHROCYTE [DISTWIDTH] IN BLOOD BY AUTOMATED COUNT: 23.4 % (ref 11.5–20)
GLUCOSE SERPL-MCNC: 234 MG/DL (ref 70–105)
HCT VFR BLD CALC: 28.6 % (ref 41–60)
HGB BLD-MCNC: 9.4 GM/DL (ref 12–16)
INR PPP: 1.06 (ref 0.5–1.4)
LYMPHOCYTE AB SER FC-ACNC: 1 TH/CMM (ref 1.5–3)
LYMPHOCYTES NFR BLD AUTO: 15.6 % (ref 20–50)
MCH RBC QN AUTO: 27.7 PG (ref 27–31)
MCHC RBC AUTO-ENTMCNC: 33.1 PG (ref 28–36)
MCV RBC AUTO: 83.7 FL (ref 81–100)
MONOCYTES # BLD AUTO: 0.2 TH/CMM (ref 0.3–1)
MONOCYTES NFR BLD AUTO: 3.8 % (ref 2–10)
NEUTROPHILS # BLD: 5.1 TH/CMM (ref 1.8–8)
NEUTROPHILS NFR BLD AUTO: 78.5 % (ref 40–80)
PLATELET # BLD: 176 TH/CMM (ref 150–400)
PMV BLD AUTO: 9.3 FL
POTASSIUM SERPL-SCNC: 3.1 MEQ/L (ref 3.5–5.1)
PROTHROMBIN TIME: 11 SECONDS (ref 9.5–11.5)
RBC # BLD AUTO: 3.41 MIL/CMM (ref 3.8–5.2)
SODIUM SERPL-SCNC: 138 MEQ/L (ref 136–145)
WBC # BLD AUTO: 6.4 TH/CMM (ref 4.8–10.8)

## 2018-01-30 PROCEDURE — 0DB68ZX EXCISION OF STOMACH, VIA NATURAL OR ARTIFICIAL OPENING ENDOSCOPIC, DIAGNOSTIC: ICD-10-PCS

## 2018-01-30 PROCEDURE — 0DB98ZX EXCISION OF DUODENUM, VIA NATURAL OR ARTIFICIAL OPENING ENDOSCOPIC, DIAGNOSTIC: ICD-10-PCS

## 2018-01-30 RX ADMIN — INSULIN ASPART SCH UNITS: 100 INJECTION, SOLUTION INTRAVENOUS; SUBCUTANEOUS at 16:34

## 2018-01-30 RX ADMIN — INSULIN ASPART SCH: 100 INJECTION, SOLUTION INTRAVENOUS; SUBCUTANEOUS at 22:16

## 2018-01-30 RX ADMIN — INSULIN ASPART SCH UNITS: 100 INJECTION, SOLUTION INTRAVENOUS; SUBCUTANEOUS at 07:08

## 2018-01-30 RX ADMIN — DEXTROSE AND SODIUM CHLORIDE SCH MLS/HR: 5; .45 INJECTION, SOLUTION INTRAVENOUS at 22:38

## 2018-01-30 RX ADMIN — DEXTROSE AND SODIUM CHLORIDE SCH MLS/HR: 5; .45 INJECTION, SOLUTION INTRAVENOUS at 07:10

## 2018-01-30 RX ADMIN — INSULIN ASPART SCH: 100 INJECTION, SOLUTION INTRAVENOUS; SUBCUTANEOUS at 11:30

## 2018-01-30 RX ADMIN — INSULIN ASPART SCH: 100 INJECTION, SOLUTION INTRAVENOUS; SUBCUTANEOUS at 21:15

## 2018-01-30 RX ADMIN — DEXTROSE AND SODIUM CHLORIDE SCH MLS/HR: 5; .45 INJECTION, SOLUTION INTRAVENOUS at 12:49

## 2018-01-30 NOTE — OPERATIVE REPORT
DATE OF SURGERY:  01/30/2018



INPATIENT EGD REPORT



PROCEDURE PERFORMED:  EGD with biopsy.



ENDOSCOPIST:  Bruno Ortiz M.D.



PREOPERATIVE DIAGNOSES:  Melena and anemia.



POSTOPERATIVE DIAGNOSES:  Gastric ulcer and duodenal ulcer.



INDICATION:  The patient is an 84-year-old female who has dysphagia and G-tube

dependence, who came into the hospital 2 days ago with septic shock, but was

also found to have anemia to 6.8 and melena on exam.  She is here for EGD.



CONSENT:  Informed consent was obtained from the patient and the patient's

family.  The risks and benefits of the procedure were discussed and include but

are not limited to infection, bleeding, perforation, need for surgery,

cardiopulmonary complications, missed pathology, and death.  The patient

indicated understanding these risks and wished to go forward with the procedure

and signed the consent form.



ANESTHESIA:  This procedure was performed under the care of the general

anesthesiologist in the main operating room.



PROCEDURE IN DETAIL:  After initiation of general anesthesia, the patient was

placed in the left lateral decubitus position and a mouthpiece was inserted and

secured.  A gastroscope was then introduced into the mouth and guided under

direct visualization into the esophagus, stomach, and duodenum.  The scope was

then slowly withdrawn making sure to examine the entire mucosa carefully. 

Retroflexion was performed in the stomach before straightening and withdrawal

from the body.  There was no obvious sign of complication at the end of

procedure.



FINDINGS:

Esophagus:  The GE junction was located at 35 cm from the incisors.  There was

no esophagitis or mass lesion in the esophagus.



Stomach:  The G-tube was seen to be in the lesser curve along the gastric body

and was very loose.  The external bumper was then tightened, so that there was 1

cm of space between the skin and the external bumper.  The internal balloon

appeared normal without any ulceration around the G-tube site.  The cardia and

fundus appeared normal as well.  There was what appeared to be a healing ulcer

at the angularis.  There was no other mass lesion or ulcer within the antrum. 

Excisional biopsies were taken from the antrum and from the ulcer edge for close

testing and histology.



Duodenum:  There was a 1 cm duodenal bulb ulcer on the anterior side of the

duodenal bulb.  There was no visible vessel in the base was observed to be

clean.  Excisional biopsies were taken from the ulcer edge.  The second portion

appeared endoscopically normal.



IMPRESSION:

1.  Gastric ulcer, status post biopsy.

2.  Duodenal bulb ulcer, status post biopsy.



IMPRESSION:  Source of bleeding is likely the duodenal ulcer given the

appearance, although now this is in the stages of healing with PPI.



RECOMMENDATIONS:

1.  We will follow up the biopsies from both the antrum and the duodenum and

treat any H. pylori if found.

2.  The patient should be on twice daily PPI therapy for at least 6 weeks and

then daily after that.

3.  Can start the tube feeds later today and advance to goal rate.

4.  The patient already has had colonoscopy at [facility] last year,

thus we are going not need to do at this time.



I will continue to follow the patient.



Thank you for allowing me to participate in her care.  Please call me with any

further questions.





DD: 01/30/2018 09:03

DT: 01/30/2018 15:12

[facility]# [id number]  [id number]

## 2018-01-30 NOTE — GENERAL PROGRESS NOTE
Subjective





- Review of Systems


Service Date: 18


Subjective: 





Pt seen and eval. She's awake today and talking. Her youngest daughter has been 

visiting.


 No n,v,d or cp. 


Pt was placed on Levophed drip on 18, then dc'd on am of 18.


No falls or sz. On iv fluids, now on D5-0.5ns. 


Stool OB positive. All anticoagulation is being held. GI scheduled EGD for today

, 18.


No sz. Afebrile.


Status post 1 unit prbc, which was ordered in the ER. 


Has a GT, but no feedings right now.








Objective





- Results


Result Diagrams: 


 18 04:30





 18 04:30


Recent Labs: 


 Laboratory Last Values











WBC  6.4 Th/cmm (4.8-10.8)   18  04:30    


 


RBC  3.41 Mil/cmm (3.80-5.20)  L  18  04:30    


 


Hgb  9.4 gm/dL (12-16)  L  18  04:30    


 


Hct  28.6 % (41.0-60)  L  18  04:30    


 


MCV  83.7 fl ()   18  04:30    


 


MCH  27.7 pg (27.0-31.0)   18  04:30    


 


MCHC Differential  33.1 pg (28.0-36.0)   18  04:30    


 


RDW  23.4 % (11.5-20.0)  H  18  04:30    


 


Plt Count  176 Th/cmm (150-400)   18  04:30    


 


MPV  9.3 fl  18  04:30    


 


Neutrophils %  78.5 % (40.0-80.0)   18  04:30    


 


Lymphocytes %  15.6 % (20.0-50.0)  L  18  04:30    


 


Monocytes %  3.8 % (2.0-10.0)   18  04:30    


 


Eosinophils %  1.8 % (0.0-5.0)   18  04:30    


 


Basophils %  0.3 % (0.0-2.0)   18  04:30    


 


PT  11.0 SECONDS (9.5-11.5)   18  04:30    


 


INR  1.06  (0.5-1.4)   18  04:30    


 


PTT (Actin FS)  23.9 SECONDS (26.0-38.0)  L  18  04:30    


 


Specimen Source  Arterial   18  08:15    


 


Sample Site  RB   18  08:15    


 


pH  7.44  (7.35-7.45)   18  08:15    


 


pCO2  36.0 mmHg (35.0-45.0)   18  08:15    


 


pO2  110.0 mmHg (80.0-100.0)  H  18  08:15    


 


HCO3  25.4 mEq/L (20.0-26.0)   18  08:15    


 


Base Excess  0.6 mEq/L (-3.0-3.0)   18  08:15    


 


O2 Saturation  98.0 % (92.0-100.0)   18  08:15    


 


Carlos Test  NA   18  08:15    


 


Vent Rate  NA   18  08:15    


 


Inspired O2  21   18  08:15    


 


Tidal Volume  NA   18  08:15    


 


PEEP  NA   18  08:15    


 


Pressure (ins/psv/peep)  NA   18  08:15    


 


Critical Value  SH   18  08:15    


 


Sodium  138 mEq/L (136-145)   18  04:30    


 


Potassium  3.1 mEq/L (3.5-5.1)  L  18  04:30    


 


Chloride  111 mEq/L ()  H  18  04:30    


 


Carbon Dioxide  19.3 mEq/L (21.0-31.0)  L  18  04:30    


 


Anion Gap  10.8  (7.0-16.0)   18  04:30    


 


BUN  8 mg/dL (7-25)   18  04:30    


 


Creatinine  0.5 mg/dL (0.6-1.2)  L  18  04:30    


 


Est GFR ( Amer)  TNP   18  04:30    


 


Est GFR (Non-Af Amer)  TNP   18  04:30    


 


BUN/Creatinine Ratio  16.0   18  04:30    


 


Glucose  234 mg/dL ()  H  18  04:30    


 


POC Glucose  214 MG/DL (70 - 105)  H  18  07:01    


 


Hemoglobin A1c %  5.4 % (4.0-6.0)   18  08:30    


 


Whole Bld Lactic Acid  1.91 mmol/L (0.60-1.99)   18  08:05    


 


Calcium  6.2 mg/dL (8.6-10.3)  L  18  04:30    


 


Magnesium  2.1 mg/dL (1.9-2.7)   18  08:05    


 


Iron  17 ug/dL ()  L  18  08:30    


 


TIBC  343 ug/dL (250-450)   18  08:30    


 


Iron Saturation  5 % (15-55)  L  18  08:30    


 


Unsaturated IBC  326 ug/dL (118-369)   18  08:30    


 


Ferritin  12 ng/mL ()  L  18  08:30    


 


Total Bilirubin  0.4 mg/dL (0.3-1.0)   18  08:05    


 


Direct Bilirubin  0.15 mg/dL (0.0-0.2)   18  08:05    


 


AST  35 U/L (13-39)   18  08:05    


 


ALT  29 U/L (7-52)   18  08:05    


 


Alkaline Phosphatase  56 U/L ()   18  08:05    


 


Ammonia  57 umol/L (16-53)  H  18  08:05    


 


B-Natriuretic Peptide  118.0 pg/mL (5.0-100.0)  H  18  08:30    


 


Total Protein  4.9 gm/dL (6.0-8.3)  L  18  08:05    


 


Albumin  2.6 gm/dL (3.7-5.3)  L  18  08:05    


 


Globulin  2.3 gm/dL  18  08:05    


 


Albumin/Globulin Ratio  1.1  (1.0-1.8)   18  08:05    


 


Triglycerides  245 mg/dL (<150)  H  18  08:30    


 


Cholesterol  96 mg/dL (<200)   18  08:30    


 


LDL Cholesterol Direct  40 mg/dL ()  L  18  08:30    


 


HDL Cholesterol  27 mg/dL (23-92)   18  08:30    


 


Lipase  42 U/L (11-82)   18  08:05    


 


TSH  8.23 uIU/ml (0.34-5.60)  H  18  08:30    


 


Urine Source  CATH   18  09:45    


 


Urine Color  YELLOW   18  09:45    


 


Urine Clarity  CLEAR  (CLEAR)   18  09:45    


 


Urine pH  7.0  (4.6 - 8.0)   18  09:45    


 


Ur Specific Gravity  1.010  (1.005-1.030)   18  09:45    


 


Urine Protein  NEGATIVE mg/dL (NEGATIVE)   18  09:45    


 


Urine Glucose (UA)  NEGATIVE mg/dL (NEGATIVE)   18  09:45    


 


Urine Ketones  NEGATIVE mg/dL (NEGATIVE)   18  09:45    


 


Urine Blood  NEGATIVE  (NEGATIVE)   18  09:45    


 


Urine Nitrate  NEGATIVE  (NEGATIVE)   18  09:45    


 


Urine Bilirubin  NEGATIVE  (NEGATIVE)   18  09:45    


 


Urine Urobilinogen  0.2 E.U./dL (0.2 - 1.0)   18  09:45    


 


Ur Leukocyte Esterase  NEGATIVE  (NEGATIVE)   18  09:45    


 


Urine RBC  NONE SEEN /hpf (0-5)   18  09:45    


 


Urine WBC  NONE SEEN /hpf (0-5)   18  09:45    


 


Ur Epithelial Cells  NONE SEEN /lpf (FEW)   18  09:45    


 


Urine Bacteria  NONE SEEN /hpf (NONE SEEN)   18  09:45    


 


Stool Occult Blood  POSITIVE  (NEGATIVE)  H  18  07:00    


 


Vancomycin Trough  11.0 ug/mL (10-20)   18  07:50    


 


Blood Type  O POSITIVE   18  10:36    


 


Antibody Screen  NEGATIVE   18  10:36    


 


Crossmatch  See Detail   18  10:36    














- Physical Exam


Vitals and I&O: 


 Vital Signs











Temp  98.5 F   18 04:00


 


Pulse  84   18 06:45


 


Resp  16   18 06:00


 


BP  102/60   18 06:45


 


Pulse Ox  100   18 06:00








 Intake & Output











 18





 18:59 06:59 18:59


 


Intake Total 1755.679 770.090 3293


 


Output Total 500 500 


 


Balance 1255.679 406.458 1307


 


Weight (lbs) 60.555 kg 61.235 kg 


 


Intake:   


 


  Intake, IV Amount 1685.679 424.980 1648


 


    D5-0.45NS 1,000 ml @ 125 1218.75 273.984 8917





    mls/hr IV .Q8H [facility] Rx#:   





    [id number]   


 


    Norepinephrine 4 mg In 66.929  





    Dextrose 5% 250 ml @ 4   





    MCG/MIN 15.24 mls/hr IV   





    TITR PRN Rx#:[id number]   


 


    Piperacillin Sodium/ 150 50 50





    Tazobact 3.375 gm In   





    Sodium Chloride 0.9% 50   





    ml @ 100 mls/hr IV Q6HR   





    [facility] Rx#:[id number]   


 


    Vancomycin HCl 1 gm In 250  





    Sodium Chloride 0.9% 250   





    ml @ 165 mls/hr IV Q24H   





    [facility] Rx#:[id number]   


 


  Oral 0  


 


  Tube Feeding 0 200 


 


  Other 70  


 


Output:   


 


  Urine 500 500 


 


Other:   


 


  # Bowel Movements 0 1 











Active Medications: 


Current Medications





Acetaminophen (Tylenol)  650 mg PO Q6H PRN


   PRN Reason: HEADACHE/TEMP ABOVE 100F


   Stop: 18 09:19


Magnesium Sulfate (Magnesium Sulfate Premix)  2 gm in 50 mls @ 25 mls/hr IV 

DAILY PRN


   PRN Reason: Magnesium level less than 1.6


   Stop: 18 09:19


Piperacillin Sod/Tazobactam (Sod 3.375 gm/ Sodium Chloride)  50 mls @ 100 mls/

hr IV Q6HR [facility]


   Stop: 18 11:59


   Last Infusion: 18 07:28 Dose:  Infused


Dextrose/Sodium Chloride (D5-0.45ns)  1,000 mls @ 125 mls/hr IV .Q8H [facility]


   Stop: 18 20:31


   Last Admin: 18 07:10 Dose:  125 mls/hr


Norepinephrine Bitartrate 4 mg (/ Dextrose)  254 mls @ 15.24 mls/hr IV TITR PRN

; Protocol; 4 MCG/MIN


   PRN Reason: BP MAINTENANCE (PER PROTOCOL)


   Stop: 18 10:17


   Last Admin: 18 16:45 Dose:  2 mcg/min, 7.62 mls/hr


Vancomycin HCl 1 gm/ Dextrose  250 mls @ 165 mls/hr IV Q24H [facility]


   Stop: 18 08:59


Insulin Aspart (Novolog Insulin Sliding Scale)  0 units SUBQ ACHS BALTA


   PRN Reason: Protocol


   Stop: 18 11:29


   Last Admin: 18 07:08 Dose:  4 units


Magnesium Oxide (Mag-Oxide)  400 mg PO BID PRN


   PRN Reason: Mg less than 1.9


   Stop: 18 09:19


Miscellaneous (Vancomycin Iv Per Pharmacy)  1 ea  PRN PRN


   PRN Reason: PROTOCOL


   Stop: 18 09:17


Miscellaneous (Zosyn Iv Per Pharmacy)  1 [facility] PRN PRN


   PRN Reason: PROTOCOL


   Stop: 18 09:17


Miscellaneous (Vte Chemical Prophylaxis Screen/ Admission)  1 ea  PRN PRN


   PRN Reason: PROTOCOL


   Stop: 18 13:59


Ondansetron HCl (Zofran)  4 mg IVP Q6H PRN


   PRN Reason: Nausea / Vomiting


   Stop: 18 09:19


Pantoprazole Sodium (Protonix)  40 mg IVP BID [facility]


   Stop: 18 16:59


   Last Admin: 18 16:28 Dose:  40 mg








General: No acute distress


HEENT: Atraumatic, PERRLA, EOMI


Neck: Supple, no JVD


Cardiovascular: Regular rate


Lungs: Other (Decreased BS bl)


Abdomen: Bowel sounds, Soft, Other (GT intact), no Tender, no Hepatomegaly


Extremities: no Clubbing


Neurological: Sensation intact


Skin: no Rash, no Breakdown


Psych/Mental Status: Other (No pyshosis)





- Procedures


Procedures: 


 Procedures











Procedure Code Date


 


BLOOD TRANSFUSION SERVICE 07287 18


 


COLONOSCOPY AND BIOPSY 12581 12


 


COLONOSCOPY W/LESION REMOVAL 36934 12


 


EGD BIOPSY SINGLE/MULTIPLE 27111 12


 


ENDO RECTUM POLYPECTOMY 48.36 12


 


ENDOSC POLYPECTOMY OF LG INTEST 45.42 12


 


ESOPHAGOGASTRODUODENOSCOPY [EGD] W/CLOSED BIOPSY 45.16 12


 


LESION REMOVAL COLONOSCOPY 14015 12


 


PACKED CELL TRANSFUSION 99.04 12


 


TRANSFUSE NONAUT RED BLOOD CELLS IN PERIPH VEIN, PERC 74477X7 18














Assessment/Plan





- Assessment


Assessment: 





Septic shock


Hypovolemic Hypernatremia


DM-OOC


Mild Manlut


Severe anemia with possible GI bleed


ME


CAD


Dyslipid


Hypokalemia





- Plan


Plan: 





PT seen by ID, GI, and Pulm.


K rider given on 18.


On aggressive hydration. 


She was placed on levophed drip on 18, then dc'd on am on 18.


Pt going for EGD today on 18.


Pt had a colonoscopy in 2017 in [facility].


On Vanco and Zosyn.


On FSBS and riss.


On iv d5-0.5ns and protonix drip.


FU on chem 7 and cbc. 


FU on cultures.





Discussed care with RN. 


Will transfer pt out of the ICU if pt's bp is maintained.


Fu on EGD results.





Nutritional Asmnt/Malnutr-PDOC





- Dietary Evaluation


Malnutrition Findings (Please click <Entered> for more info): 








Nutritional Asmnt/Malnutrition                             Start:  18 13:

06


Text:                                                      Status: Complete    

  


Freq:                                                                          

  


 Document     18 13:06  LETICIA  (Rec: 18 13:17  MMULCALEB XAVIER-

FNS1)


 Nutritional Asmnt/Malnutrition


     Patient General Information


      Nutritional Screening                      High Risk


                                                 Consult


      Diagnosis                                  Septic shock


      Pertinent Medical Hx/Surgical Hx           diabetes, dyslipidemia, CAD,


                                                 dementia


      Subjective Information                     Consult received for wound


                                                 present on admissino and


                                                 gris scale <12. Patient


                                                 admitted from [facility] with hyperglycemia


                                                 (489 BG, then 533). Patient


                                                 lying in bed at time of visit.


      Current Diet Order/ Nutrition Support      NPO


      Patient / S.O                              Not Indicated


      Pertinent Medications                      NOvolog, Mag-oxide, abx,


                                                 zofran


      Pertinent Labs                             : Na 151, BUN 91, glucose


                                                 139-255, AST 43, albumin 3.3,


                                                 


     Nutritional Hx/Data


      Height                                     1.65 m


      Height (Calculated Centimeters)            165.1


      Current Weight (lbs)                       61.235 kg


      Weight (Calculated Kilograms)              61.2


      Weight (Calculated Grams)                  91180.0


      Ideal Body Weight                          125


      % Ideal Body Weight                        108


      Body Mass Index (BMI)                      22.4


      Recent Weight Change                       No


      Weight Status                              Approriate


     GI Symptoms


      GI Symptoms                                None


      Last BM                                    None noted since admission


      Difficult in:                              None


      Food Allergies                             No


      Cultural/Ethnic/Voodoo Belief           none indicated


      Skin Integrity/Comment:                    Gris


     Estimated Nutritional Goals


      BEE in Kcals:                              Using Current wt


      Calories/Kcals/Kg                          27-32 kcal/kg- using 61.3 kg


                                                 Current body weight- sepsis


      Kcals Calculated                           ~0579-0009 kcal/day


      Protein:                                   Using Current wt


      Protein g/k.2-1.5 gm/kg - Sepsis


      Protein Calculated                         ~75-92 gm/day


      Fluid: ml                                  ~6466-3779 ml/day (1 ml/kcal)


     Nutritional Problem


      2. Problem


       Problem                                   Altered nutrition related lab


                                                 values related to


       Etiology                                  uncontrolled hyperglycemia aeb


       Signs/Symptoms:                           glucose 139-255,


      1. Problem


       Problem                                   Inadequate energy intake


                                                 related to


       Etiology                                  withholding of nutrition at


                                                 this time with NPO diet order


                                                 aeb


       Signs/Symptoms:                           meeting <25% of estimated


                                                 nutrient needs.


     Intervention/Recommendation


      Comments                                   1. Due to hx dysphagia and


                                                 Gtube, when medically


                                                 appropriate, start


                                                 Diabetisource AC at goal of 55


                                                 ml/hr to provide 1320 ml


                                                 volume, 1584 kcal, 79 gm


                                                 protein.


                                                 2. Adjust insulin regimen per


                                                 MD for optimal glycemic


                                                 control.


     Expected Outcomes/Goals


      Expected Outcomes/Goals                    meets >75% of estimated


                                                 nutrient needs, improved skin


                                                 integrity, nutrition related


                                                 labs normalize,


                                                 F/U HR 2-3 days -

## 2018-01-30 NOTE — CONSULTATION
DATE OF CONSULTATION:  01/29/2018



A patient of Dr. Hess.



HISTORY AND PHYSICAL:  This is an 84-year-old female patient, who was brought

from skilled nursing facility because of altered level of consciousness.  The

patient was found to have severe anemia, respiratory failure.  The patient was

given transfusion.  The patient developed hypotension.  The patient is on

Levophed and hence cardiac consult is requested.



PAST MEDICAL HISTORY:  Acute respiratory failure, hypotension, diabetes mellitus

type 2, dementia, aspiration pneumonia, GI bleed with anemia, anxiety, melanoma,

osteoporosis.



FAMILY HISTORY:  Unremarkable.



SOCIAL HISTORY:  No history of smoking or alcohol abuse.



ALLERGIES:  No known allergies.



PHYSICAL EXAMINATION:

VITAL SIGNS:  Blood pressure 90 systolic, on Levophed; pulse 100; respirations

28.

HEAD:  Normocephalic.  No lumps or bumps.

EYES:  Pupils equal and reactive to light.  Fundi show AV nicking, sclerae

white, conjunctivae pink.

NECK:  Carotid 2+.  Normal upstroke.  JVD flat.  Thyroid not palpable.  Lymph

nodes not palpable.

CHEST:  Shows increased AP diameter.  No kyphosis, scoliosis.

LUNGS:  Bilateral bronchovesicular breath sounds.  Bilateral wheezing, rhonchi,

prolonged expiration.

HEART:  PMI fifth intercostal space with lateral to midclavicular line.  S1

irregular.  S2, S3, S4.  Soft systolic murmur.

ABDOMEN:  Soft.  Liver and spleen not palpable.  No organomegaly.  Bowel sounds

active.

NEUROLOGIC:  Unremarkable.

EXTREMITIES:  Peripheral pulses 2+.  No pedal edema.



CLINICAL IMPRESSION:  Acute respiratory failure, severe anemia with blood

transfusion, hypotension, diabetes mellitus type 2, dementia, aspiration

pneumonia, gastrointestinal bleed, anxiety, melanoma, osteoporosis.



PLAN:  We will continue the patient on Levophed and continue with transfusion

and IV antibiotics.





DD: 01/29/2018 12:50

DT: 01/30/2018 11:59

[facility]# [id number]  [id number]

## 2018-01-30 NOTE — DIAGNOSTIC IMAGING REPORT
CHEST X-RAY: AP view



INDICATION: Shortness of breath



COMPARISON: 1/28/2018



FINDINGS: Mild chronic lung changes are noted with no focal

consolidation or pleural effusions.  Heart size is normal. 

Atherosclerosis is noted.  Degenerative changes of the spine are noted. 

Cholecystectomy clips are noted.



IMPRESSION:



Mild chronic lung changes.  No focal consolidation identified.



Atherosclerotic vascular disease.

## 2018-01-30 NOTE — PROGRESS NOTES
DATE:  01/29/2018



PROBLEM LIST:

1.  Acute diabetic with severe hypoglycemia.

2.  Constipation.

3.  Possibly gastrointestinal bleeding.

4.  Possibly Alzheimer.



SYMPTOMS:  Nil.  The patient is much more awake, alert, So at times, restless,

no distress, on low dose of Levophed.



PHYSICAL EXAMINATION:

VITAL SIGNS:  Temperature is 98.1, blood pressure 100/60, saturation 100% on

supplemental oxygen.

NECK:  Veins not visualized.

CHEST:  Shows diminished air entry.

HEART:  Regular.

ABDOMEN:  Soft and nontender.



LABORATORY DATA:  The patient's pertinent laboratory studies today show, white

count is 7000, hemoglobin 9.6, potassium is 3.3, chloride is 115 and the

patient's endoscopy exam is postponed.



PLANS AND SUGGESTIONS:  Discussed with nursing staff, needs to start feeding as

the patient has been on for the last couple of days and became n.p.o. ____ doing

and go from there.  We will repeat some of the labs tomorrow.





DD: 01/29/2018 21:27

DT: 01/30/2018 03:20

JOB# [id number]  [id number]

## 2018-01-31 LAB
ANION GAP SERPL CALC-SCNC: 9.6 MMOL/L (ref 7–16)
BASOPHILS # BLD AUTO: 0 TH/CUMM (ref 0–0.2)
BASOPHILS NFR BLD AUTO: 0.6 % (ref 0–2)
BUN SERPL-MCNC: 3 MG/DL (ref 7–25)
CALCIUM SERPL-MCNC: 6.1 MG/DL (ref 8.6–10.3)
CHLORIDE SERPL-SCNC: 117 MEQ/L (ref 98–107)
CO2 SERPL-SCNC: 19.6 MEQ/L (ref 21–31)
CREAT SERPL-MCNC: 0.4 MG/DL (ref 0.6–1.2)
EOSINOPHIL # BLD AUTO: 0.1 TH/CMM (ref 0.1–0.4)
EOSINOPHIL NFR BLD AUTO: 2.5 % (ref 0–5)
ERYTHROCYTE [DISTWIDTH] IN BLOOD BY AUTOMATED COUNT: 22.5 % (ref 11.5–20)
GLUCOSE SERPL-MCNC: 210 MG/DL (ref 70–105)
HCT VFR BLD CALC: 26.9 % (ref 41–60)
HGB BLD-MCNC: 8.8 GM/DL (ref 12–16)
LYMPHOCYTE AB SER FC-ACNC: 1.2 TH/CMM (ref 1.5–3)
LYMPHOCYTES NFR BLD AUTO: 23 % (ref 20–50)
MCH RBC QN AUTO: 27 PG (ref 27–31)
MCHC RBC AUTO-ENTMCNC: 32.6 PG (ref 28–36)
MCV RBC AUTO: 82.8 FL (ref 81–100)
MONOCYTES # BLD AUTO: 0.3 TH/CMM (ref 0.3–1)
MONOCYTES NFR BLD AUTO: 5.3 % (ref 2–10)
NEUTROPHILS # BLD: 3.6 TH/CMM (ref 1.8–8)
NEUTROPHILS NFR BLD AUTO: 68.6 % (ref 40–80)
PLATELET # BLD: 159 TH/CMM (ref 150–400)
PMV BLD AUTO: 9.2 FL
POTASSIUM SERPL-SCNC: 3.2 MEQ/L (ref 3.5–5.1)
RBC # BLD AUTO: 3.25 MIL/CMM (ref 3.8–5.2)
SODIUM SERPL-SCNC: 143 MEQ/L (ref 136–145)
WBC # BLD AUTO: 5.2 TH/CMM (ref 4.8–10.8)

## 2018-01-31 RX ADMIN — POLYETHYLENE GLYCOL 3350 SCH GM: 17 POWDER, FOR SOLUTION ORAL at 17:02

## 2018-01-31 RX ADMIN — INSULIN ASPART SCH: 100 INJECTION, SOLUTION INTRAVENOUS; SUBCUTANEOUS at 17:05

## 2018-01-31 RX ADMIN — POLYETHYLENE GLYCOL 3350 SCH GM: 17 POWDER, FOR SOLUTION ORAL at 09:33

## 2018-01-31 RX ADMIN — INSULIN ASPART SCH UNITS: 100 INJECTION, SOLUTION INTRAVENOUS; SUBCUTANEOUS at 07:35

## 2018-01-31 RX ADMIN — INSULIN ASPART SCH: 100 INJECTION, SOLUTION INTRAVENOUS; SUBCUTANEOUS at 22:00

## 2018-01-31 RX ADMIN — INSULIN ASPART SCH UNITS: 100 INJECTION, SOLUTION INTRAVENOUS; SUBCUTANEOUS at 11:17

## 2018-01-31 NOTE — PROGRESS NOTES
DATE:  01/30/2018



PULMONARY/CRITICAL CARE PROGRESS NOTE



PROBLEM LIST:

1.  Status post acute hyperglycemia.

2.  Bleeding, question of GI.  No active bleeding found.

3.  Constipation.

4.  Chronic encephalopathic state.



SYMPTOMS:  The patient is awake.  Complains of pain in the leg in the upper

thigh.  No other related symptomatology otherwise.



PHYSICAL EXAMINATION:

VITAL SIGNS:  The patient's BP is fairly stable.  Pulse is in 80s, blood

pressure 116/92, and saturation is 98%.

NECK:  Veins not visualized.

CHEST:  Shows clear with diminished air entry.

HEART:  Regular.

ABDOMEN:  Slightly distended with G-tube; otherwise, unremarkable.

EXTREMITIES:  Shows STDs, but otherwise unremarkable.



DIAGNOSTIC DATA:  The patient's chest x-ray shows some mild interstitial

disease, which seems to be chronic.



LABORATORY DATA:  White count of 6.4, hemoglobin 9.4.  Potassium is 3.1 and

sugar is in mid-to-low ____.



ASSESSMENT:  The patient is clinically seemingly stable, no evidence of active

bleeding and stable hemodynamically.



PLANS AND SUGGESTIONS:  We will go ahead and restart the feeding and recheck the

lab again tomorrow and hopefully if it remains stable discharge planning may be

a consideration and go from there.





DD: 01/30/2018 20:13

DT: 01/31/2018 03:54

JOB# [id number]  [id number]

## 2018-01-31 NOTE — GI PROGRESS NOTE
Subjective





- Review of Systems


Service Date: 01/31/18


Subjective: 


EGD yesterday found gastric and duodenal ulcer. Passing some melena still. Hgb 

stable 





Objective





- Results


Result Diagrams: 


 01/31/18 04:15





 01/31/18 04:15


Recent Labs: 


 Laboratory Last Values











WBC  5.2 Th/cmm (4.8-10.8)   01/31/18  04:15    


 


RBC  3.25 Mil/cmm (3.80-5.20)  L  01/31/18  04:15    


 


Hgb  8.8 gm/dL (12-16)  L  01/31/18  04:15    


 


Hct  26.9 % (41.0-60)  L  01/31/18  04:15    


 


MCV  82.8 fl ()   01/31/18  04:15    


 


MCH  27.0 pg (27.0-31.0)   01/31/18  04:15    


 


MCHC Differential  32.6 pg (28.0-36.0)   01/31/18  04:15    


 


RDW  22.5 % (11.5-20.0)  H  01/31/18  04:15    


 


Plt Count  159 Th/cmm (150-400)   01/31/18  04:15    


 


MPV  9.2 fl  01/31/18  04:15    


 


Neutrophils %  68.6 % (40.0-80.0)   01/31/18  04:15    


 


Lymphocytes %  23.0 % (20.0-50.0)   01/31/18  04:15    


 


Monocytes %  5.3 % (2.0-10.0)   01/31/18  04:15    


 


Eosinophils %  2.5 % (0.0-5.0)   01/31/18  04:15    


 


Basophils %  0.6 % (0.0-2.0)   01/31/18  04:15    


 


PT  11.0 SECONDS (9.5-11.5)   01/30/18  04:30    


 


INR  1.06  (0.5-1.4)   01/30/18  04:30    


 


PTT (Actin FS)  23.9 SECONDS (26.0-38.0)  L  01/30/18  04:30    


 


Specimen Source  Arterial   01/28/18  08:15    


 


Sample Site  RB   01/28/18  08:15    


 


pH  7.44  (7.35-7.45)   01/28/18  08:15    


 


pCO2  36.0 mmHg (35.0-45.0)   01/28/18  08:15    


 


pO2  110.0 mmHg (80.0-100.0)  H  01/28/18  08:15    


 


HCO3  25.4 mEq/L (20.0-26.0)   01/28/18  08:15    


 


Base Excess  0.6 mEq/L (-3.0-3.0)   01/28/18  08:15    


 


O2 Saturation  98.0 % (92.0-100.0)   01/28/18  08:15    


 


Carlos Test  NA   01/28/18  08:15    


 


Vent Rate  NA   01/28/18  08:15    


 


Inspired O2  21   01/28/18  08:15    


 


Tidal Volume  NA   01/28/18  08:15    


 


PEEP  NA   01/28/18  08:15    


 


Pressure (ins/psv/peep)  NA   01/28/18  08:15    


 


Critical Value  SH   01/28/18  08:15    


 


Sodium  143 mEq/L (136-145)   01/31/18  04:15    


 


Potassium  3.2 mEq/L (3.5-5.1)  L  01/31/18  04:15    


 


Chloride  117 mEq/L ()  H  01/31/18  04:15    


 


Carbon Dioxide  19.6 mEq/L (21.0-31.0)  L  01/31/18  04:15    


 


Anion Gap  9.6  (7.0-16.0)   01/31/18  04:15    


 


BUN  3 mg/dL (7-25)  L  01/31/18  04:15    


 


Creatinine  0.4 mg/dL (0.6-1.2)  L  01/31/18  04:15    


 


Est GFR ( Amer)  TNP   01/31/18  04:15    


 


Est GFR (Non-Af Amer)  TNP   01/31/18  04:15    


 


BUN/Creatinine Ratio  7.5   01/31/18  04:15    


 


Glucose  210 mg/dL ()  H  01/31/18  04:15    


 


POC Glucose  196 MG/DL (70 - 105)  H  01/31/18  06:18    


 


Hemoglobin A1c %  5.4 % (4.0-6.0)   01/27/18  08:30    


 


Whole Bld Lactic Acid  1.91 mmol/L (0.60-1.99)   01/28/18  08:05    


 


Calcium  6.1 mg/dL (8.6-10.3)  L  01/31/18  04:15    


 


Magnesium  1.7 mg/dL (1.9-2.7)  L  01/30/18  04:30    


 


Iron  17 ug/dL ()  L  01/27/18  08:30    


 


TIBC  343 ug/dL (250-450)   01/27/18  08:30    


 


Iron Saturation  5 % (15-55)  L  01/27/18  08:30    


 


Unsaturated IBC  326 ug/dL (118-369)   01/27/18  08:30    


 


Ferritin  12 ng/mL ()  L  01/27/18  08:30    


 


Total Bilirubin  0.4 mg/dL (0.3-1.0)   01/28/18  08:05    


 


Direct Bilirubin  0.15 mg/dL (0.0-0.2)   01/28/18  08:05    


 


AST  35 U/L (13-39)   01/28/18  08:05    


 


ALT  29 U/L (7-52)   01/28/18  08:05    


 


Alkaline Phosphatase  56 U/L ()   01/28/18  08:05    


 


Ammonia  57 umol/L (16-53)  H  01/28/18  08:05    


 


B-Natriuretic Peptide  118.0 pg/mL (5.0-100.0)  H  01/27/18  08:30    


 


Total Protein  4.9 gm/dL (6.0-8.3)  L  01/28/18  08:05    


 


Albumin  2.6 gm/dL (3.7-5.3)  L  01/28/18  08:05    


 


Globulin  2.3 gm/dL  01/28/18  08:05    


 


Albumin/Globulin Ratio  1.1  (1.0-1.8)   01/28/18  08:05    


 


Triglycerides  245 mg/dL (<150)  H  01/27/18  08:30    


 


Cholesterol  96 mg/dL (<200)   01/27/18  08:30    


 


LDL Cholesterol Direct  40 mg/dL ()  L  01/27/18  08:30    


 


HDL Cholesterol  27 mg/dL (23-92)   01/27/18  08:30    


 


Lipase  42 U/L (11-82)   01/28/18  08:05    


 


TSH  8.23 uIU/ml (0.34-5.60)  H  01/27/18  08:30    


 


Urine Source  CATH   01/27/18  09:45    


 


Urine Color  YELLOW   01/27/18  09:45    


 


Urine Clarity  CLEAR  (CLEAR)   01/27/18  09:45    


 


Urine pH  7.0  (4.6 - 8.0)   01/27/18  09:45    


 


Ur Specific Gravity  1.010  (1.005-1.030)   01/27/18  09:45    


 


Urine Protein  NEGATIVE mg/dL (NEGATIVE)   01/27/18  09:45    


 


Urine Glucose (UA)  NEGATIVE mg/dL (NEGATIVE)   01/27/18  09:45    


 


Urine Ketones  NEGATIVE mg/dL (NEGATIVE)   01/27/18  09:45    


 


Urine Blood  NEGATIVE  (NEGATIVE)   01/27/18  09:45    


 


Urine Nitrate  NEGATIVE  (NEGATIVE)   01/27/18  09:45    


 


Urine Bilirubin  NEGATIVE  (NEGATIVE)   01/27/18  09:45    


 


Urine Urobilinogen  0.2 E.U./dL (0.2 - 1.0)   01/27/18  09:45    


 


Ur Leukocyte Esterase  NEGATIVE  (NEGATIVE)   01/27/18  09:45    


 


Urine RBC  NONE SEEN /hpf (0-5)   01/27/18  09:45    


 


Urine WBC  NONE SEEN /hpf (0-5)   01/27/18  09:45    


 


Ur Epithelial Cells  NONE SEEN /lpf (FEW)   01/27/18  09:45    


 


Urine Bacteria  NONE SEEN /hpf (NONE SEEN)   01/27/18  09:45    


 


Stool Occult Blood  POSITIVE  (NEGATIVE)  H  01/28/18  07:00    


 


Vancomycin Trough  11.0 ug/mL (10-20)   01/29/18  07:50    


 


Blood Type  O POSITIVE   01/27/18  10:36    


 


Antibody Screen  NEGATIVE   01/27/18  10:36    


 


Crossmatch  See Detail   01/27/18  10:36    














- Physical Exam


Vitals and I&O: 


 Vital Signs











Temp  97.4 F   01/31/18 04:00


 


Pulse  86   01/31/18 06:00


 


Resp  14   01/31/18 06:00


 


BP  143/87   01/31/18 06:00


 


Pulse Ox  98   01/31/18 06:00








 Intake & Output











 01/30/18 01/31/18 01/31/18





 18:59 06:59 18:59


 


Intake Total 2056.25 1050 


 


Output Total 2700  


 


Balance -643.75 1050 


 


Weight (lbs) 55.429 kg  


 


Intake:   


 


  Intake, IV Amount 2056.25 1050 


 


    D5-0.45NS 1,000 ml @ 125 1706.25 1000 





    mls/hr IV .Q8H [facility] Rx#:   





    [id number]   


 


    Piperacillin Sodium/ 100 50 





    Tazobact 3.375 gm In   





    Sodium Chloride 0.9% 50   





    ml @ 100 mls/hr IV Q6HR   





    [facility] Rx#:[id number]   


 


    Vancomycin HCl 1 gm In 250  





    Dextrose 5% 250 ml @ 165   





    mls/hr IV Q24H [facility] Rx#:   





    [id number]   


 


  Tube Feeding 0  


 


Output:   


 


  Urine 2400  


 


  Stool 300  


 


Other:   


 


  # Bowel Movements 1  











Active Medications: 


Current Medications





Acetaminophen (Tylenol)  650 mg PO Q6H PRN


   PRN Reason: HEADACHE/TEMP ABOVE 100F


   Stop: 03/28/18 09:19


Magnesium Sulfate (Magnesium Sulfate Premix)  2 gm in 50 mls @ 25 mls/hr IV 

DAILY PRN


   PRN Reason: Magnesium level less than 1.6


   Stop: 03/28/18 09:19


Piperacillin Sod/Tazobactam (Sod 3.375 gm/ Sodium Chloride)  50 mls @ 100 mls/

hr IV Q6HR [facility]


   Stop: 03/28/18 11:59


   Last Admin: 01/31/18 05:26 Dose:  100 mls/hr


Dextrose/Sodium Chloride (D5-0.45ns)  1,000 mls @ 125 mls/hr IV .Q8H [facility]


   Stop: 03/28/18 20:31


   Last Admin: 01/30/18 22:38 Dose:  125 mls/hr


Norepinephrine Bitartrate 4 mg (/ Dextrose)  254 mls @ 15.24 mls/hr IV TITR PRN

; Protocol; 4 MCG/MIN


   PRN Reason: BP MAINTENANCE (PER PROTOCOL)


   Stop: 03/30/18 10:17


   Last Admin: 01/29/18 16:45 Dose:  2 mcg/min, 7.62 mls/hr


Vancomycin HCl 1 gm/ Dextrose  250 mls @ 165 mls/hr IV Q24H [facility]


   Stop: 03/31/18 08:59


   Last Infusion: 01/30/18 14:13 Dose:  Infused


Potassium Chloride 40 meq/Lidocaine HCl 25 mg/ Sodium Chloride  272.5 mls @ 68 

mls/hr IV DAILY PRN


   PRN Reason: k level less than 3.2


   Stop: 03/31/18 08:59


   Last Admin: 01/30/18 16:19 Dose:  68 mls/hr


Insulin Aspart (Novolog Insulin Sliding Scale)  0 units SUBQ ACHS BALTA


   PRN Reason: Protocol


   Stop: 03/28/18 11:29


   Last Admin: 01/30/18 22:16 Dose:  Not Given


Magnesium Oxide (Mag-Oxide)  400 mg PO BID PRN


   PRN Reason: Mg less than 1.9


   Stop: 03/28/18 09:19


   Last Admin: 01/30/18 13:07 Dose:  400 mg


Miscellaneous (Vancomycin Iv Per Pharmacy)  1 ea  PRN PRN


   PRN Reason: PROTOCOL


   Stop: 03/28/18 09:17


Miscellaneous (Zosyn Iv Per Pharmacy)  1 [facility] PRN PRN


   PRN Reason: PROTOCOL


   Stop: 03/28/18 09:17


Miscellaneous (Vte Chemical Prophylaxis Screen/ Admission)  1 [facility] PRN PRN


   PRN Reason: PROTOCOL


   Stop: 03/28/18 13:59


Ondansetron HCl (Zofran)  4 mg IVP Q6H PRN


   PRN Reason: Nausea / Vomiting


   Stop: 03/28/18 09:19


Pantoprazole Sodium (Protonix)  40 mg IVP BID BALTA


   Stop: 03/30/18 16:59


   Last Admin: 01/30/18 12:42 Dose:  40 mg


Tramadol HCl (Ultram)  50 mg PO Q6HR PRN


   PRN Reason: Pain (Moderate)


   Stop: 03/31/18 20:18


   Last Admin: 01/30/18 21:15 Dose:  50 mg








General: Alert, No acute distress


HEENT: Atraumatic, PERRLA, EOMI


Neck: Supple, no JVD


Cardiovascular: Regular rate


Lungs: Other (Decreased BS bl)


Abdomen: Bowel sounds, Soft, Other (GT intact), no Tender, no Hepatomegaly


Extremities: no Clubbing


Neurological: Sensation intact


Skin: no Rash, no Breakdown


Psych/Mental Status: Other (No pyshosis)





- Procedures


Procedures: 


 Procedures











Procedure Code Date


 


BLOOD TRANSFUSION SERVICE 23045 01/27/18


 


COLONOSCOPY AND BIOPSY 67257 06/28/12


 


COLONOSCOPY W/LESION REMOVAL 15679 06/28/12


 


EGD BIOPSY SINGLE/MULTIPLE 24177 06/28/12


 


ENDO RECTUM POLYPECTOMY 48.36 06/28/12


 


ENDOSC POLYPECTOMY OF LG INTEST 45.42 06/28/12


 


ESOPHAGOGASTRODUODENOSCOPY [EGD] W/CLOSED BIOPSY 45.16 06/28/12


 


LESION REMOVAL COLONOSCOPY 95199 06/28/12


 


PACKED CELL TRANSFUSION 99.04 06/28/12


 


TRANSFUSE NONAUT RED BLOOD CELLS IN PERIPH VEIN, [facility] [id number] 01/27/18














Assessment/Plan





- Assessment


Assessment: 


# Anemia


# Fecal impaction


# Melena


# Septic shock


# Uncontrolled diabetes





Initial hgb was 6.8, and she has responded well to blood products without 

further melena. EGD on 1/30 showed gastric and duodenal ulcers, s/p biopsies. 





Colonoscopy already performed at [place] in 9/2017, found polyps as per the 

daughter's report.








Plan:





- cont PPI bid for 6 weeks, then can transition to daily


-f/u gastric and duodenal biopsies. Treat H pylori if found


- cont tube feeding


- cycle CBCs, transfuse hgb > 7


- bowel regimen given fecal impaction








Thank you for allowing me to participate in this patient's care, please call 

with any questions

## 2018-01-31 NOTE — PATHOLOGY REPORT
Collection Date:  1/30/2018 



Surgeon:  Dr. DESMOND Ortiz



Specimen Description:  

1.  Antrum biopsy

2.  Duodenal biopsy



Gross Description:  Part I:  Received in formalin are two tan soft tissue

fragments ranging from 0.1 to 0.2 cm in greatest dimension.  Totally submitted

in one cassette labeled A.



Gross Description:  Part II:  Received in formalin are two tan soft tissue

fragments ranging from 0.1 to 0.2 cm in greatest dimension.  Totally submitted

in one cassette labeled B.



Microscopic Description:  Part I:  The histologic sections show gastric mucosa

with chronic inflammation present consisting of lymphocytes and plasma cells. 

The Giemsa stain shows no evidence for Helicobacter pylori.



Diagnosis:  Part I: 

1.  Chronic gastritis, antrum biopsy.

2.  The Giemsa stain is negative for Helicobacter pylori.



Microscopic Description:  Part II:  The histologic sections show duodenal mucosa

with intact intestinal villi, showing no evidence for villous abnormality. 

There is mild to moderate chronic inflammation present consisting of increased

numbers of lymphocytes and plasma cells.  



Diagnosis:  Part II: 

1.  Nonspecific chronic inflammation, duodenal biopsy.

2.  There is no evidence for celiac disease/sprue.





DD: 01/31/2018 12:55

DT: 01/31/2018 15:52

[facility]# [id number]  [id number]

## 2018-01-31 NOTE — GENERAL PROGRESS NOTE
Subjective





- Review of Systems


Service Date: 18


Subjective: 





Pt seen and eval. She's awake today and talking. Her youngest daughter has been 

visiting.


 No n,v,d or cp. 


Pt was placed on Levophed drip on 18, then dc'd on am of 18.


No falls or sz. On IV fluids. Will cut down since she's on GT feedings now.


Stool OB positive. All anticoagulation is being held. GI did  EGD on 18, 

she has gastric and duodenal ulcer.


No sz. Afebrile.


Status post 1 unit prbc, which was ordered in the ER. 


Has a GT-feedings resumed by GI on 18.





Objective





- Results


Result Diagrams: 


 18 04:15





 18 04:15


Recent Labs: 


 Laboratory Last Values











WBC  5.2 Th/cmm (4.8-10.8)   18  04:15    


 


RBC  3.25 Mil/cmm (3.80-5.20)  L  18  04:15    


 


Hgb  8.8 gm/dL (12-16)  L  18  04:15    


 


Hct  26.9 % (41.0-60)  L  18  04:15    


 


MCV  82.8 fl ()   18  04:15    


 


MCH  27.0 pg (27.0-31.0)   18  04:15    


 


MCHC Differential  32.6 pg (28.0-36.0)   18  04:15    


 


RDW  22.5 % (11.5-20.0)  H  18  04:15    


 


Plt Count  159 Th/cmm (150-400)   18  04:15    


 


MPV  9.2 fl  18  04:15    


 


Neutrophils %  68.6 % (40.0-80.0)   18  04:15    


 


Lymphocytes %  23.0 % (20.0-50.0)   18  04:15    


 


Monocytes %  5.3 % (2.0-10.0)   18  04:15    


 


Eosinophils %  2.5 % (0.0-5.0)   18  04:15    


 


Basophils %  0.6 % (0.0-2.0)   18  04:15    


 


PT  11.0 SECONDS (9.5-11.5)   18  04:30    


 


INR  1.06  (0.5-1.4)   18  04:30    


 


PTT (Actin FS)  23.9 SECONDS (26.0-38.0)  L  18  04:30    


 


Specimen Source  Arterial   18  08:15    


 


Sample Site  RB   18  08:15    


 


pH  7.44  (7.35-7.45)   18  08:15    


 


pCO2  36.0 mmHg (35.0-45.0)   18  08:15    


 


pO2  110.0 mmHg (80.0-100.0)  H  18  08:15    


 


HCO3  25.4 mEq/L (20.0-26.0)   18  08:15    


 


Base Excess  0.6 mEq/L (-3.0-3.0)   18  08:15    


 


O2 Saturation  98.0 % (92.0-100.0)   18  08:15    


 


Carlos Test  NA   18  08:15    


 


Vent Rate  NA   18  08:15    


 


Inspired O2  21   18  08:15    


 


Tidal Volume  NA   18  08:15    


 


PEEP  NA   18  08:15    


 


Pressure (ins/psv/peep)  NA   18  08:15    


 


Critical Value  SH   18  08:15    


 


Sodium  143 mEq/L (136-145)   18  04:15    


 


Potassium  3.2 mEq/L (3.5-5.1)  L  18  04:15    


 


Chloride  117 mEq/L ()  H  18  04:15    


 


Carbon Dioxide  19.6 mEq/L (21.0-31.0)  L  18  04:15    


 


Anion Gap  9.6  (7.0-16.0)   18  04:15    


 


BUN  3 mg/dL (7-25)  L  18  04:15    


 


Creatinine  0.4 mg/dL (0.6-1.2)  L  18  04:15    


 


Est GFR ( Amer)  TNP   18  04:15    


 


Est GFR (Non-Af Amer)  TNP   18  04:15    


 


BUN/Creatinine Ratio  7.5   18  04:15    


 


Glucose  210 mg/dL ()  H  18  04:15    


 


POC Glucose  196 MG/DL (70 - 105)  H  18  06:18    


 


Hemoglobin A1c %  5.4 % (4.0-6.0)   18  08:30    


 


Whole Bld Lactic Acid  1.91 mmol/L (0.60-1.99)   18  08:05    


 


Calcium  6.1 mg/dL (8.6-10.3)  L  18  04:15    


 


Magnesium  1.7 mg/dL (1.9-2.7)  L  18  04:30    


 


Iron  17 ug/dL ()  L  18  08:30    


 


TIBC  343 ug/dL (250-450)   18  08:30    


 


Iron Saturation  5 % (15-55)  L  18  08:30    


 


Unsaturated IBC  326 ug/dL (118-369)   18  08:30    


 


Ferritin  12 ng/mL ()  L  18  08:30    


 


Total Bilirubin  0.4 mg/dL (0.3-1.0)   18  08:05    


 


Direct Bilirubin  0.15 mg/dL (0.0-0.2)   18  08:05    


 


AST  35 U/L (13-39)   18  08:05    


 


ALT  29 U/L (7-52)   18  08:05    


 


Alkaline Phosphatase  56 U/L ()   18  08:05    


 


Ammonia  57 umol/L (16-53)  H  18  08:05    


 


B-Natriuretic Peptide  118.0 pg/mL (5.0-100.0)  H  18  08:30    


 


Total Protein  4.9 gm/dL (6.0-8.3)  L  18  08:05    


 


Albumin  2.6 gm/dL (3.7-5.3)  L  18  08:05    


 


Globulin  2.3 gm/dL  18  08:05    


 


Albumin/Globulin Ratio  1.1  (1.0-1.8)   18  08:05    


 


Triglycerides  245 mg/dL (<150)  H  18  08:30    


 


Cholesterol  96 mg/dL (<200)   18  08:30    


 


LDL Cholesterol Direct  40 mg/dL ()  L  18  08:30    


 


HDL Cholesterol  27 mg/dL (23-92)   18  08:30    


 


Lipase  42 U/L (11-82)   18  08:05    


 


TSH  8.23 uIU/ml (0.34-5.60)  H  18  08:30    


 


Urine Source  CATH   18  09:45    


 


Urine Color  YELLOW   18  09:45    


 


Urine Clarity  CLEAR  (CLEAR)   18  09:45    


 


Urine pH  7.0  (4.6 - 8.0)   18  09:45    


 


Ur Specific Gravity  1.010  (1.005-1.030)   18  09:45    


 


Urine Protein  NEGATIVE mg/dL (NEGATIVE)   18  09:45    


 


Urine Glucose (UA)  NEGATIVE mg/dL (NEGATIVE)   18  09:45    


 


Urine Ketones  NEGATIVE mg/dL (NEGATIVE)   18  09:45    


 


Urine Blood  NEGATIVE  (NEGATIVE)   18  09:45    


 


Urine Nitrate  NEGATIVE  (NEGATIVE)   18  09:45    


 


Urine Bilirubin  NEGATIVE  (NEGATIVE)   18  09:45    


 


Urine Urobilinogen  0.2 E.U./dL (0.2 - 1.0)   18  09:45    


 


Ur Leukocyte Esterase  NEGATIVE  (NEGATIVE)   18  09:45    


 


Urine RBC  NONE SEEN /hpf (0-5)   18  09:45    


 


Urine WBC  NONE SEEN /hpf (0-5)   18  09:45    


 


Ur Epithelial Cells  NONE SEEN /lpf (FEW)   18  09:45    


 


Urine Bacteria  NONE SEEN /hpf (NONE SEEN)   18  09:45    


 


Stool Occult Blood  POSITIVE  (NEGATIVE)  H  18  07:00    


 


Vancomycin Trough  11.0 ug/mL (10-20)   18  07:50    


 


Blood Type  O POSITIVE   18  10:36    


 


Antibody Screen  NEGATIVE   18  10:36    


 


Crossmatch  See Detail   18  10:36    














- Physical Exam


Vitals and I&O: 


 Vital Signs











Temp  97.4 F   18 04:00


 


Pulse  83   18 08:13


 


Resp  14   18 08:13


 


BP  144/66   18 07:00


 


Pulse Ox  98   18 08:13








 Intake & Output











 18





 18:59 06:59 18:59


 


Intake Total 2056.25 1050 375


 


Output Total 2700  1350


 


Balance -643.75 1050 -975


 


Weight (lbs) 55.429 kg  56.382 kg


 


Intake:   


 


  Intake, IV Amount 2056.25 1050 


 


    D5-0.45NS 1,000 ml @ 125 1706.25 1000 





    mls/hr IV .Q8H [facility] Rx#:   





    [id number]   


 


    Piperacillin Sodium/ 100 50 





    Tazobact 3.375 gm In   





    Sodium Chloride 0.9% 50   





    ml @ 100 mls/hr IV Q6HR   





    [facility] Rx#:[id number]   


 


    Vancomycin HCl 1 gm In 250  





    Dextrose 5% 250 ml @ 165   





    mls/hr IV Q24H [facility] Rx#:   





    [id number]   


 


  Tube Feeding 0  375


 


Output:   


 


  Urine 2400  1350


 


  Stool 300  


 


Other:   


 


  # Bowel Movements 1  2











Active Medications: 


Current Medications





Acetaminophen (Tylenol)  650 mg PO Q6H PRN


   PRN Reason: HEADACHE/TEMP ABOVE 100F


   Stop: 18 09:19


Magnesium Sulfate (Magnesium Sulfate Premix)  2 gm in 50 mls @ 25 mls/hr IV 

DAILY PRN


   PRN Reason: Magnesium level less than 1.6


   Stop: 18 09:19


Piperacillin Sod/Tazobactam (Sod 3.375 gm/ Sodium Chloride)  50 mls @ 100 mls/

hr IV Q6HR [facility]


   Stop: 18 11:59


   Last Admin: 18 05:26 Dose:  100 mls/hr


Dextrose/Sodium Chloride (D5-0.45ns)  1,000 mls @ 125 mls/hr IV .Q8H [facility]


   Stop: 18 20:31


   Last Admin: 18 22:38 Dose:  125 mls/hr


Norepinephrine Bitartrate 4 mg (/ Dextrose)  254 mls @ 15.24 mls/hr IV TITR PRN

; Protocol; 4 MCG/MIN


   PRN Reason: BP MAINTENANCE (PER PROTOCOL)


   Stop: 18 10:17


   Last Admin: 18 16:45 Dose:  2 mcg/min, 7.62 mls/hr


Vancomycin HCl 1 gm/ Dextrose  250 mls @ 165 mls/hr IV Q24H [facility]


   Stop: 18 08:59


   Last Infusion: 18 14:13 Dose:  Infused


Potassium Chloride 40 meq/Lidocaine HCl 25 mg/ Sodium Chloride  272.5 mls @ 68 

mls/hr IV DAILY PRN


   PRN Reason: k level less than 3.2


   Stop: 18 08:59


   Last Admin: 18 16:19 Dose:  68 mls/hr


Insulin Aspart (Novolog Insulin Sliding Scale)  0 units SUBQ ACHS BALTA


   PRN Reason: Protocol


   Stop: 18 11:29


   Last Admin: 18 07:35 Dose:  2 units


Magnesium Oxide (Mag-Oxide)  400 mg PO BID PRN


   PRN Reason: Mg less than 1.9


   Stop: 18 09:19


   Last Admin: 18 13:07 Dose:  400 mg


Miscellaneous (Vancomycin Iv Per Pharmacy)  1 [facility] PRN PRN


   PRN Reason: PROTOCOL


   Stop: 18 09:17


Miscellaneous (Zosyn Iv Per Pharmacy)  1 [facility] PRN PRN


   PRN Reason: PROTOCOL


   Stop: 18 09:17


Miscellaneous (Vte [employer] Prophylaxis Screen/ Admission)  1 [facility] PRN PRN


   PRN Reason: PROTOCOL


   Stop: 18 13:59


Ondansetron HCl (Zofran)  4 mg IVP Q6H PRN


   PRN Reason: Nausea / Vomiting


   Stop: 18 09:19


Pantoprazole Sodium (Protonix)  40 mg IVP BID [facility]


   Stop: 18 16:59


   Last Admin: 18 12:42 Dose:  40 mg


Polyethylene Glycol (Miralax)  17 gm PO BID [facility]


   Stop: 18 08:59


Tramadol HCl (Ultram)  50 mg PO Q6HR PRN


   PRN Reason: Pain (Moderate)


   Stop: 18 20:18


   Last Admin: 18 21:15 Dose:  50 mg








General: Alert, No acute distress


HEENT: Atraumatic, PERRLA, EOMI


Neck: Supple, no JVD


Cardiovascular: Regular rate


Lungs: Other (Decreased BS bl)


Abdomen: Bowel sounds, Soft, Other (GT intact), no Tender, no Hepatomegaly


Extremities: no Clubbing


Neurological: Sensation intact


Skin: no Rash, no Breakdown


Psych/Mental Status: Other (No pyshosis)





- Procedures


Procedures: 


 Procedures











Procedure Code Date


 


BLOOD TRANSFUSION SERVICE 04235 18


 


COLONOSCOPY AND BIOPSY 52723 12


 


COLONOSCOPY W/LESION REMOVAL 89184 12


 


EGD BIOPSY SINGLE/MULTIPLE 93223 12


 


ENDO RECTUM POLYPECTOMY 48.36 12


 


ENDOSC POLYPECTOMY OF LG INTEST 45.42 12


 


ESOPHAGOGASTRODUODENOSCOPY [EGD] W/CLOSED BIOPSY 45.16 12


 


LESION REMOVAL COLONOSCOPY 10935 12


 


PACKED CELL TRANSFUSION 99.04 12


 


TRANSFUSE NONAUT RED BLOOD CELLS IN PERIPH VEIN, PERC 31215K0 18














Assessment/Plan





- Assessment


Assessment: 





Septic shock


Hypovolemic Hypernatremia


DM-OOC


Mild Manlut


Severe anemia with possible GI bleed-Gastric and Duondenal ulcer


ME


CAD


Dyslipid


Hypokalemia





- Plan


Plan: 





PT seen by ID, GI, and Pulm.


K rider given on 18. Another KCl today.


On aggressive hydration. 


She was placed on levophed drip on 18, then dc'd on am on 18.


No falls or sz. On IV fluids. Will reduce rate since she's on GT feedings now.


Downgrade to tele today. No drips.


Stool OB positive. All anticoagulation is being held. GI did  EGD on 18, 

she has gastric and duodenal ulcer.


No sz. Afebrile.


Status post 1 unit prbc, which was ordered in the ER. 


Has a GT-feedings resumed by GI on 18.





Nutritional Asmnt/Malnutr-PDOC





- Dietary Evaluation


Malnutrition Findings (Please click <Entered> for more info): 








Nutritional Asmnt/Malnutrition                             Start:  18 13:

06


Text:                                                      Status: Complete    

  


Freq:                                                                          

  


 Document     18 13:06  MMULCALEB  (Rec: 18 13:17  LETICIA  DUC-

FNS1)


 Nutritional Asmnt/Malnutrition


     Patient General Information


      Nutritional Screening                      High Risk


                                                 Consult


      Diagnosis                                  Septic shock


      Pertinent Medical Hx/Surgical Hx           diabetes, dyslipidemia, CAD,


                                                 dementia


      Subjective Information                     Consult received for wound


                                                 present on admissino and


                                                 gris scale <12. Patient


                                                 admitted from [facility] with hyperglycemia


                                                 (489 BG, then 533). Patient


                                                 lying in bed at time of visit.


      Current Diet Order/ Nutrition Support      NPO


      Patient / S.O                              Not Indicated


      Pertinent Medications                      NOvolog, Mag-oxide, abx,


                                                 zofran


      Pertinent Labs                             : Na 151, BUN 91, glucose


                                                 139-255, AST 43, albumin 3.3,


                                                 


     Nutritional Hx/Data


      Height                                     1.65 m


      Height (Calculated Centimeters)            165.1


      Current Weight (lbs)                       61.235 kg


      Weight (Calculated Kilograms)              61.2


      Weight (Calculated Grams)                  98327.0


      Ideal Body Weight                          125


      % Ideal Body Weight                        108


      Body Mass Index (BMI)                      22.4


      Recent Weight Change                       No


      Weight Status                              Approriate


     GI Symptoms


      GI Symptoms                                None


      Last BM                                    None noted since admission


      Difficult in:                              None


      Food Allergies                             No


      Cultural/Ethnic/Holiness Belief           none indicated


      Skin Integrity/Comment:                    Gris


     Estimated Nutritional Goals


      BEE in Kcals:                              Using Current wt


      Calories/Kcals/Kg                          27-32 kcal/kg- using 61.3 kg


                                                 Current body weight- sepsis


      Kcals Calculated                           ~9015-3556 kcal/day


      Protein:                                   Using Current wt


      Protein g/k.2-1.5 gm/kg - Sepsis


      Protein Calculated                         ~75-92 gm/day


      Fluid: ml                                  ~0307-8468 ml/day (1 ml/kcal)


     Nutritional Problem


      2. Problem


       Problem                                   Altered nutrition related lab


                                                 values related to


       Etiology                                  uncontrolled hyperglycemia aeb


       Signs/Symptoms:                           glucose 139-255,


      1. Problem


       Problem                                   Inadequate energy intake


                                                 related to


       Etiology                                  withholding of nutrition at


                                                 this time with NPO diet order


                                                 aeb


       Signs/Symptoms:                           meeting <25% of estimated


                                                 nutrient needs.


     Intervention/Recommendation


      Comments                                   1. Due to hx dysphagia and


                                                 Gtube, when medically


                                                 appropriate, start


                                                 Diabetisource AC at goal of 55


                                                 ml/hr to provide 1320 ml


                                                 volume, 1584 kcal, 79 gm


                                                 protein.


                                                 2. Adjust insulin regimen per


                                                 MD for optimal glycemic


                                                 control.


     Expected Outcomes/Goals


      Expected Outcomes/Goals                    meets >75% of estimated


                                                 nutrient needs, improved skin


                                                 integrity, nutrition related


                                                 labs normalize,


                                                 F/U HR 2-3 days -

## 2018-02-01 LAB
ANION GAP SERPL CALC-SCNC: 11.9 MMOL/L (ref 7–16)
BASOPHILS # BLD AUTO: 0.1 TH/CUMM (ref 0–0.2)
BASOPHILS NFR BLD AUTO: 1.3 % (ref 0–2)
BUN SERPL-MCNC: 3 MG/DL (ref 7–25)
CALCIUM SERPL-MCNC: 6.7 MG/DL (ref 8.6–10.3)
CHLORIDE SERPL-SCNC: 113 MEQ/L (ref 98–107)
CO2 SERPL-SCNC: 19.5 MEQ/L (ref 21–31)
CREAT SERPL-MCNC: 0.4 MG/DL (ref 0.6–1.2)
EOSINOPHIL # BLD AUTO: 0.1 TH/CMM (ref 0.1–0.4)
EOSINOPHIL NFR BLD AUTO: 2.1 % (ref 0–5)
ERYTHROCYTE [DISTWIDTH] IN BLOOD BY AUTOMATED COUNT: 23.1 % (ref 11.5–20)
GLUCOSE SERPL-MCNC: 219 MG/DL (ref 70–105)
HCT VFR BLD CALC: 28.5 % (ref 41–60)
HGB BLD-MCNC: 9.1 GM/DL (ref 12–16)
LYMPHOCYTE AB SER FC-ACNC: 0.7 TH/CMM (ref 1.5–3)
LYMPHOCYTES NFR BLD AUTO: 14.5 % (ref 20–50)
MCH RBC QN AUTO: 26.5 PG (ref 27–31)
MCHC RBC AUTO-ENTMCNC: 31.9 PG (ref 28–36)
MCV RBC AUTO: 83 FL (ref 81–100)
MONOCYTES # BLD AUTO: 0.2 TH/CMM (ref 0.3–1)
MONOCYTES NFR BLD AUTO: 4.9 % (ref 2–10)
NEUTROPHILS # BLD: 3.9 TH/CMM (ref 1.8–8)
NEUTROPHILS NFR BLD AUTO: 77.2 % (ref 40–80)
PLATELET # BLD: 170 TH/CMM (ref 150–400)
PMV BLD AUTO: 8.5 FL
POTASSIUM SERPL-SCNC: 3.4 MEQ/L (ref 3.5–5.1)
RBC # BLD AUTO: 3.44 MIL/CMM (ref 3.8–5.2)
SODIUM SERPL-SCNC: 141 MEQ/L (ref 136–145)
WBC # BLD AUTO: 5 TH/CMM (ref 4.8–10.8)

## 2018-02-01 RX ADMIN — INSULIN ASPART SCH UNITS: 100 INJECTION, SOLUTION INTRAVENOUS; SUBCUTANEOUS at 12:10

## 2018-02-01 RX ADMIN — INSULIN ASPART SCH UNITS: 100 INJECTION, SOLUTION INTRAVENOUS; SUBCUTANEOUS at 07:55

## 2018-02-01 RX ADMIN — POLYETHYLENE GLYCOL 3350 SCH GM: 17 POWDER, FOR SOLUTION ORAL at 09:03

## 2018-02-01 NOTE — DISCHARGE SUMMARY
DATE OF DISCHARGE:  02/01/2018



CAUSE OF ADMISSION:  The patient is an 84-year-old female who is a patient of

mine at skilled nursing facility.  She has a history of diabetes along with

dysphagia, anxiety disorder, mood disorder, dementia and dyslipidemia.  She was

found to be extremely lethargic at the facility.  Her blood sugars were over 500

and she declined significantly with her mood and physical status as well.  She

was sent to the hospital where she was found to be in sepsis.



ADMITTING DIAGNOSES:

1.  Septic shock.

2.  Hypovolemic hypernatremia.

3.  Diabetes mellitus, out of control.

4.  Mild malnutrition.

5.  Metabolic encephalopathy.

6.  Coronary artery disease.

7.  Dyslipidemia.

8.  Anemia of chronic illness.



DISCHARGE DIAGNOSES:

1.  Septic shock.

2.  Hypovolemic hypernatremia.

3.  Diabetes mellitus, out of control.

4.  Mild malnutrition.

5.  Severe anemia with the possible gastrointestinal bleed.

6.  Gastric and duodenal ulcer.

7.  Metabolic encephalopathy.

8.  Coronary artery disease.

9.  Dyslipidemia.

10.  Hypokalemia.

11.  Hypocalcemia.



SUMMARY OF HOSPITAL COURSE:  The patient was seen by ID, GI and Pulmonary.  She

was given K riders multiple times.  She received aggressive hydration.  She was

ultimately placed on Levophed drip on 01/29/2018 and discontinued on the a.m. of

01/30/ 2018.  Reduced IV rate once her G-tube feedings were started.  ID and

Pulmonary and GI saw the patient.  Stool OB was positive.  All anticoagulation

was held.  GI did EGD on 01/30/2018.  She was found to have gastric and duodenal

ulcers.  She has been afebrile.  The blood pressure is improved.  She was moved

out of ICU on 01/31/2018.  She received one unit PRBC, which was ordered by the

ER physician.  G-tube feedings were resumed on 01/30/2018.  She is doing much

better.  She is talking now.  I also met with the daughter at the bedside and

she is pleased with the care.



PHYSICAL EXAMINATION:

VITAL SIGNS:  Were the follows:  Temperature 97.8 degrees, heart rate is 86,

respirations 17 and blood pressure 119/58.  Currently, in no pain.

GENERAL:  No acute distress, awake.  She is alert to name.

HEENT:  No acute issues.

NECK:  Trachea is midline.

CARDIOVASCULAR:  Regular rate and rhythm.

ABDOMEN:  Nontender and nondistended.

SKIN:  No rashes.



LABORATORY DATA:  White count is 5.0, hemoglobin is 9.1 and platelet count

170,000.  INR is 1.06.  Labs from 01/31/2018:  Chemistry panel shows sodium 143,

potassium 3.2, chloride 117, bicarbonate 19.6, BUN 3, creatinine 0.4 and glucose

is 201.  Please note that the patient did receive 40 mEq of potassium on

01/31/2018, but today's chemistry panel is pending.  Calcium is 6.1.



DISPOSITION:  She is being discharged back to skilled nursing facility.



MEDICATIONS:  All medications reviewed and reconciled.



PROCEDURES:  EGD.



CONSULTS:

1.  Dr. Caputo's group for GI.

2.  Dr. Cox for Pulmonary.

3.  Dr. Javier Cox for ID.



Also, the patient has been on IV antibiotics.  Blood cultures were negative. 

She has been on vancomycin and Zosyn, which will be stopped upon discharge.





DD: 02/01/2018 08:57

DT: 02/01/2018 20:44

JOB# [id number]  [id number]

## 2018-02-01 NOTE — PROGRESS NOTES
DATE:  01/27/2018



PROBLEM LIST:

1.  Hyperglycemia, resolved.

2.  Possibly GI bleeding.

3.  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



SYMPTOMS:  Nil.



MOTOR:  Telemetry.  No specific new symptomatology at this particular time.



PHYSICAL EXAMINATION:

VITAL SIGNS:  The patient's recorded vitals:  Temperature is 97.8, blood

pressure is 120/54, saturation 99 on room air.

NECK:  Veins not visualized.

CHEST:  Shows diminished air entry.  No other adventitious breath sound.

HEART:  Regular.



LABORATORY DATA:  White count is 5.2, hemoglobin 8.8.  Potassium is 3.2.



ASSESSMENT:  The patient is clinically stable, appears to have _____ still need

to watch hemoglobin and continue low dose of Laxative, continue bolus feeding

and go from there.





DD: 01/31/2018 21:26

DT: 02/01/2018 10:24

[facility]# [id number]  [id number]

## 2018-02-01 NOTE — INFECTIOUS DISEASE PROG NOTE
Infectious Disease Subjective





- Review of Systems


Service Date: 18


Subjective: 





doing well. No fever.





Infectious Disease Objective





- Results


Result Diagrams: 


 18 08:00





 18 08:00


Recent Labs: 


 Laboratory Last Values











WBC  5.0 Th/cmm (4.8-10.8)   18  08:00    


 


RBC  3.44 Mil/cmm (3.80-5.20)  L  18  08:00    


 


Hgb  9.1 gm/dL (12-16)  L  18  08:00    


 


Hct  28.5 % (41.0-60)  L  18  08:00    


 


MCV  83.0 fl ()   18  08:00    


 


MCH  26.5 pg (27.0-31.0)  L  18  08:00    


 


MCHC Differential  31.9 pg (28.0-36.0)   18  08:00    


 


RDW  23.1 % (11.5-20.0)  H  18  08:00    


 


Plt Count  170 Th/cmm (150-400)   18  08:00    


 


MPV  8.5 fl  18  08:00    


 


Neutrophils %  77.2 % (40.0-80.0)   18  08:00    


 


Lymphocytes %  14.5 % (20.0-50.0)  L  18  08:00    


 


Monocytes %  4.9 % (2.0-10.0)   18  08:00    


 


Eosinophils %  2.1 % (0.0-5.0)   18  08:00    


 


Basophils %  1.3 % (0.0-2.0)   18  08:00    


 


PT  11.0 SECONDS (9.5-11.5)   18  04:30    


 


INR  1.06  (0.5-1.4)   18  04:30    


 


PTT (Actin FS)  23.9 SECONDS (26.0-38.0)  L  18  04:30    


 


Specimen Source  Arterial   18  08:15    


 


Sample Site  RB   18  08:15    


 


pH  7.44  (7.35-7.45)   18  08:15    


 


pCO2  36.0 mmHg (35.0-45.0)   18  08:15    


 


pO2  110.0 mmHg (80.0-100.0)  H  18  08:15    


 


HCO3  25.4 mEq/L (20.0-26.0)   18  08:15    


 


Base Excess  0.6 mEq/L (-3.0-3.0)   18  08:15    


 


O2 Saturation  98.0 % (92.0-100.0)   18  08:15    


 


Carlos Test  NA   18  08:15    


 


Vent Rate  NA   18  08:15    


 


Inspired O2  21   18  08:15    


 


Tidal Volume  NA   18  08:15    


 


PEEP  NA   18  08:15    


 


Pressure (ins/psv/peep)  NA   18  08:15    


 


Critical Value  SH   18  08:15    


 


Sodium  141 mEq/L (136-145)   18  08:00    


 


Potassium  3.4 mEq/L (3.5-5.1)  L  18  08:00    


 


Chloride  113 mEq/L ()  H  18  08:00    


 


Carbon Dioxide  19.5 mEq/L (21.0-31.0)  L  18  08:00    


 


Anion Gap  11.9  (7.0-16.0)   18  08:00    


 


BUN  3 mg/dL (7-25)  L  18  08:00    


 


Creatinine  0.4 mg/dL (0.6-1.2)  L  18  08:00    


 


Est GFR ( Amer)  TNP   18  08:00    


 


Est GFR (Non-Af Amer)  [facility]   18  08:00    


 


BUN/Creatinine Ratio  7.5   18  08:00    


 


Glucose  219 mg/dL ()  H  18  08:00    


 


POC Glucose  203 MG/DL (70 - 105)  H  18  11:45    


 


Hemoglobin A1c %  5.4 % (4.0-6.0)   18  08:30    


 


Whole Bld Lactic Acid  1.91 mmol/L (0.60-1.99)   18  08:05    


 


Calcium  6.7 mg/dL (8.6-10.3)  L  18  08:00    


 


Magnesium  1.7 mg/dL (1.9-2.7)  L  18  04:30    


 


Iron  17 ug/dL ()  L  18  08:30    


 


TIBC  343 ug/dL (250-450)   18  08:30    


 


Iron Saturation  5 % (15-55)  L  18  08:30    


 


Unsaturated IBC  326 ug/dL (118-369)   18  08:30    


 


Ferritin  12 ng/mL ()  L  18  08:30    


 


Total Bilirubin  0.4 mg/dL (0.3-1.0)   18  08:05    


 


Direct Bilirubin  0.15 mg/dL (0.0-0.2)   18  08:05    


 


AST  35 U/L (13-39)   18  08:05    


 


ALT  29 U/L (7-52)   18  08:05    


 


Alkaline Phosphatase  56 U/L ()   18  08:05    


 


Ammonia  57 umol/L (16-53)  H  18  08:05    


 


B-Natriuretic Peptide  118.0 pg/mL (5.0-100.0)  H  18  08:30    


 


Total Protein  4.9 gm/dL (6.0-8.3)  L  18  08:05    


 


Albumin  2.6 gm/dL (3.7-5.3)  L  18  08:05    


 


Globulin  2.3 gm/dL  18  08:05    


 


Albumin/Globulin Ratio  1.1  (1.0-1.8)   18  08:05    


 


Triglycerides  245 mg/dL (<150)  H  18  08:30    


 


Cholesterol  96 mg/dL (<200)   18  08:30    


 


LDL Cholesterol Direct  40 mg/dL ()  L  18  08:30    


 


HDL Cholesterol  27 mg/dL (23-92)   18  08:30    


 


Lipase  42 U/L (11-82)   18  08:05    


 


TSH  8.23 uIU/ml (0.34-5.60)  H  18  08:30    


 


Urine Source  CATH   18  09:45    


 


Urine Color  YELLOW   18  09:45    


 


Urine Clarity  CLEAR  (CLEAR)   18  09:45    


 


Urine pH  7.0  (4.6 - 8.0)   18  09:45    


 


Ur Specific Gravity  1.010  (1.005-1.030)   18  09:45    


 


Urine Protein  NEGATIVE mg/dL (NEGATIVE)   18  09:45    


 


Urine Glucose (UA)  NEGATIVE mg/dL (NEGATIVE)   18  09:45    


 


Urine Ketones  NEGATIVE mg/dL (NEGATIVE)   18  09:45    


 


Urine Blood  NEGATIVE  (NEGATIVE)   18  09:45    


 


Urine Nitrate  NEGATIVE  (NEGATIVE)   18  09:45    


 


Urine Bilirubin  NEGATIVE  (NEGATIVE)   18  09:45    


 


Urine Urobilinogen  0.2 E.U./dL (0.2 - 1.0)   18  09:45    


 


Ur Leukocyte Esterase  NEGATIVE  (NEGATIVE)   18  09:45    


 


Urine RBC  NONE SEEN /hpf (0-5)   18  09:45    


 


Urine WBC  NONE SEEN /hpf (0-5)   18  09:45    


 


Ur Epithelial Cells  NONE SEEN /lpf (FEW)   18  09:45    


 


Urine Bacteria  NONE SEEN /hpf (NONE SEEN)   18  09:45    


 


Stool Occult Blood  POSITIVE  (NEGATIVE)  H  18  07:00    


 


Vancomycin Trough  12.3 ug/mL (10-20)   18  08:00    


 


Helicobacter pylori Ab  NEGATIVE  (NEGATIVE)   18  10:50    


 


Blood Type  O POSITIVE   18  10:36    


 


Antibody Screen  NEGATIVE   18  10:36    


 


Crossmatch  See Detail   18  10:36    














- Physical Exam


Vitals and I&O: 


 Vital Signs











Temp  98 F   18 11:20


 


Pulse  87   18 11:20


 


Resp  17   18 11:20


 


BP  130/66   18 11:20


 


Pulse Ox  97   18 11:20








 Intake & Output











 18





 18:59 06:59 18:59


 


Intake Total 675 150 400


 


Output Total 1350  900


 


Balance -675 150 -500


 


Weight (lbs) 56.382 kg  56.245 kg


 


Intake:   


 


  Intake, IV Amount 300 150 


 


    Piperacillin Sodium/ 50 150 





    Tazobact 3.375 gm In   





    Sodium Chloride 0.9% 50   





    ml @ 100 mls/hr IV Q6HR   





    [facility] Rx#:[id number]   


 


    Vancomycin HCl 1 gm In 250  





    Dextrose 5% 250 ml @ 165   





    mls/hr IV Q24H [facility] Rx#:   





    [id number]   


 


  Tube Feeding 375  300


 


  Other   100


 


Output:   


 


  Urine 1350  900


 


Other:   


 


  # Bowel Movements 2  1


 


  Stool Characteristics Soft  Soft





 Brown  Black





 Black  











Active Medications: 


Current Medications





Acetaminophen (Tylenol)  650 mg PO Q6H PRN


   PRN Reason: HEADACHE/TEMP ABOVE 100F


   Stop: 18 09:19


Magnesium Sulfate (Magnesium Sulfate Premix)  2 gm in 50 mls @ 25 mls/hr IV 

DAILY PRN


   PRN Reason: Magnesium level less than 1.6


   Stop: 18 09:19


Piperacillin Sod/Tazobactam (Sod 3.375 gm/ Sodium Chloride)  50 mls @ 100 mls/

hr IV Q6HR [facility]


   Stop: 18 11:59


   Last Infusion: 18 06:10 Dose:  Infused


Norepinephrine Bitartrate 4 mg (/ Dextrose)  254 mls @ 15.24 mls/hr IV TITR PRN

; Protocol; 4 MCG/MIN


   PRN Reason: BP MAINTENANCE (PER PROTOCOL)


   Stop: 18 10:17


   Last Admin: 18 16:45 Dose:  2 mcg/min, 7.62 mls/hr


Vancomycin HCl 1 gm/ Dextrose  250 mls @ 165 mls/hr IV Q24H [facility]


   Stop: 18 08:59


   Last Admin: 18 10:31 Dose:  165 mls/hr


Potassium Chloride 40 meq/Lidocaine HCl 25 mg/ Sodium Chloride  272.5 mls @ 68 

mls/hr IV DAILY PRN


   PRN Reason: k level less than 3.2


   Stop: 18 08:59


   Last Admin: 18 16:19 Dose:  68 mls/hr


Dextrose/Sodium Chloride (D5-0.45ns)  1,000 mls @ 70 mls/hr IV .[id number] [facility]


   Stop: 18 08:56


   Last Admin: 18 09:33 Dose:  70 mls/hr


Insulin Aspart (Novolog Insulin Sliding Scale)  0 units SUBQ ACHS BALTA


   PRN Reason: Protocol


   Stop: 18 11:29


   Last Admin: 18 12:10 Dose:  4 units


Lactobacillus Rhamnosus (Culturelle 15b)  1 each PO DAILY [facility]


   Stop: 18 08:59


Magnesium Oxide (Mag-Oxide)  400 mg PO BID PRN


   PRN Reason: Mg less than 1.9


   Stop: 18 09:19


   Last Admin: 18 13:07 Dose:  400 mg


Miscellaneous (Vancomycin Iv Per Pharmacy)  1 ea  PRN PRN


   PRN Reason: PROTOCOL


   Stop: 18 09:17


Miscellaneous (Zosyn Iv Per Pharmacy)  1 [facility] PRN PRN


   PRN Reason: PROTOCOL


   Stop: 18 09:17


Miscellaneous (Vte [employer] Prophylaxis Screen/ Admission)  1 [facility] PRN PRN


   PRN Reason: PROTOCOL


   Stop: 18 13:59


Miscellaneous (Probiotic Screen)  1 [facility] PRN PRN


   PRN Reason: PROTOCOL


   Stop: 18 12:14


Ondansetron HCl (Zofran)  4 mg IVP Q6H PRN


   PRN Reason: Nausea / Vomiting


   Stop: 18 09:19


Pantoprazole Sodium (Protonix)  40 mg IVP BID [facility]


   Stop: 18 16:59


   Last Admin: 18 09:03 Dose:  40 mg


Polyethylene Glycol (Miralax)  17 gm PO BID [facility]


   Stop: 18 08:59


   Last Admin: 18 09:03 Dose:  17 gm


Potassium Chloride (Potassium Chloride Elixir)  40 meq GT DAILY PRN


   PRN Reason: PROTOCOL


   Stop: 18 09:24


Tramadol HCl (Ultram)  50 mg PO Q6HR PRN


   PRN Reason: Pain (Moderate)


   Stop: 18 20:18


   Last Admin: 18 21:15 Dose:  50 mg








General: no acute distress, well developed, well nourished


HEENT: atraumatic, normocephalic, PERRLA


Neck: supple, no thyromegaly


Cardiovascular: S1S2, regular


Lungs: clear to auscultation bilaterally, clear to percussion


Abdomen: soft, no tender, no distended


Extremities: no cyanosis, no clubbing, no edema


Neurological: awake, alert, oriented


Skin: intact





- Procedures


Procedures: 


 Procedures











Procedure Code Date


 


BLOOD TRANSFUSION SERVICE 84727 18


 


COLONOSCOPY AND BIOPSY 74086 12


 


COLONOSCOPY W/LESION REMOVAL 57338 12


 


EGD BIOPSY SINGLE/MULTIPLE 65457 12


 


ENDO RECTUM POLYPECTOMY 48.36 12


 


ENDOSC POLYPECTOMY OF LG INTEST 45.42 12


 


ESOPHAGOGASTRODUODENOSCOPY [EGD] W/CLOSED BIOPSY 45.16 12


 


LESION REMOVAL COLONOSCOPY 43838 12


 


PACKED CELL TRANSFUSION 99.04 12


 


TRANSFUSE NONAUT RED BLOOD CELLS IN PERIPH VEIN, PERC 03504L9 18














Infectious Disease Assmt/Plan





- Assessment


Assessment: 





 


1.  Septic shock, improved.


2.  Pneumonia, questionable.


3.  Diabetes mellitus type, uncontrolled.


4.  Dementia.


5.  Azotemia.


6. Sacral wounds stage 2 small, no sign of infection.





 








- Plan


Plan: 


 Change vancomycin and Zosyn to levaquin po for 5 days.


  





Nutritional Asmnt/Malnutr-PDOC





- Dietary Evaluation


Malnutrition Findings (Please click <Entered> for more info): 








Nutritional Asmnt/Malnutrition                             Start:  18 13:

06


Text:                                                      Status: Complete    

  


Freq:                                                                          

  


 Document     18 13:06  MMULCALEB  (Rec: 18 13:17  MMULCALEB XAVIER-

FNS1)


 Nutritional Asmnt/Malnutrition


     Patient General Information


      Nutritional Screening                      High Risk


                                                 Consult


      Diagnosis                                  Septic shock


      Pertinent Medical Hx/Surgical Hx           diabetes, dyslipidemia, CAD,


                                                 dementia


      Subjective Information                     Consult received for wound


                                                 present on admissino and


                                                 gris scale <12. Patient


                                                 admitted from [facility] with hyperglycemia


                                                 (489 BG, then 533). Patient


                                                 lying in bed at time of visit.


      Current Diet Order/ Nutrition Support      NPO


      Patient / S.O                              Not Indicated


      Pertinent Medications                      NOvolog, Mag-oxide, abx,


                                                 zofran


      Pertinent Labs                             : Na 151, BUN 91, glucose


                                                 139-255, AST 43, albumin 3.3,


                                                 


     Nutritional Hx/Data


      Height                                     1.65 m


      Height (Calculated Centimeters)            165.1


      Current Weight (lbs)                       61.235 kg


      Weight (Calculated Kilograms)              61.2


      Weight (Calculated Grams)                  76998.0


      Ideal Body Weight                          125


      % Ideal Body Weight                        108


      Body Mass Index (BMI)                      22.4


      Recent Weight Change                       No


      Weight Status                              Approriate


     GI Symptoms


      GI Symptoms                                None


      Last BM                                    None noted since admission


      Difficult in:                              None


      Food Allergies                             No


      Cultural/Ethnic/Jew Belief           none indicated


      Skin Integrity/Comment:                    Gris


     Estimated Nutritional Goals


      BEE in Kcals:                              Using Current wt


      Calories/Kcals/Kg                          27-32 kcal/kg- using 61.3 kg


                                                 Current body weight- sepsis


      Kcals Calculated                           ~0280-8857 kcal/day


      Protein:                                   Using Current wt


      Protein g/k.2-1.5 gm/kg - Sepsis


      Protein Calculated                         ~75-92 gm/day


      Fluid: ml                                  ~1003-9093 ml/day (1 ml/kcal)


     Nutritional Problem


      2. Problem


       Problem                                   Altered nutrition related lab


                                                 values related to


       Etiology                                  uncontrolled hyperglycemia aeb


       Signs/Symptoms:                           glucose 139-255,


      1. Problem


       Problem                                   Inadequate energy intake


                                                 related to


       Etiology                                  withholding of nutrition at


                                                 this time with NPO diet order


                                                 aeb


       Signs/Symptoms:                           meeting <25% of estimated


                                                 nutrient needs.


     Intervention/Recommendation


      Comments                                   1. Due to hx dysphagia and


                                                 Gtube, when medically


                                                 appropriate, start


                                                 Diabetisource AC at goal of 55


                                                 ml/hr to provide 1320 ml


                                                 volume, 1584 kcal, 79 gm


                                                 protein.


                                                 2. Adjust insulin regimen per


                                                 MD for optimal glycemic


                                                 control.


     Expected Outcomes/Goals


      Expected Outcomes/Goals                    meets >75% of estimated


                                                 nutrient needs, improved skin


                                                 integrity, nutrition related


                                                 labs normalize,


                                                 F/U HR 2-3 days -

## 2018-06-12 ENCOUNTER — HOSPITAL ENCOUNTER (INPATIENT)
Dept: HOSPITAL 26 - MED | Age: 83
LOS: 4 days | Discharge: NURSING, REHAB OR LONG-TERM CARE | DRG: 811 | End: 2018-06-16
Attending: GENERAL PRACTICE | Admitting: GENERAL PRACTICE
Payer: COMMERCIAL

## 2018-06-12 VITALS — SYSTOLIC BLOOD PRESSURE: 91 MMHG | DIASTOLIC BLOOD PRESSURE: 45 MMHG

## 2018-06-12 VITALS — WEIGHT: 128 LBS | HEIGHT: 60 IN | BODY MASS INDEX: 25.13 KG/M2

## 2018-06-12 VITALS — SYSTOLIC BLOOD PRESSURE: 107 MMHG | DIASTOLIC BLOOD PRESSURE: 53 MMHG

## 2018-06-12 DIAGNOSIS — H40.9: ICD-10-CM

## 2018-06-12 DIAGNOSIS — Z86.73: ICD-10-CM

## 2018-06-12 DIAGNOSIS — Z93.1: ICD-10-CM

## 2018-06-12 DIAGNOSIS — Z80.9: ICD-10-CM

## 2018-06-12 DIAGNOSIS — Z83.3: ICD-10-CM

## 2018-06-12 DIAGNOSIS — Z82.49: ICD-10-CM

## 2018-06-12 DIAGNOSIS — E11.621: ICD-10-CM

## 2018-06-12 DIAGNOSIS — I25.10: ICD-10-CM

## 2018-06-12 DIAGNOSIS — E11.9: ICD-10-CM

## 2018-06-12 DIAGNOSIS — I10: ICD-10-CM

## 2018-06-12 DIAGNOSIS — M85.80: ICD-10-CM

## 2018-06-12 DIAGNOSIS — F03.90: ICD-10-CM

## 2018-06-12 DIAGNOSIS — D50.9: Primary | ICD-10-CM

## 2018-06-12 DIAGNOSIS — E11.51: ICD-10-CM

## 2018-06-12 DIAGNOSIS — K21.9: ICD-10-CM

## 2018-06-12 DIAGNOSIS — E44.0: ICD-10-CM

## 2018-06-12 DIAGNOSIS — Z88.8: ICD-10-CM

## 2018-06-12 DIAGNOSIS — I48.2: ICD-10-CM

## 2018-06-12 DIAGNOSIS — Z88.5: ICD-10-CM

## 2018-06-12 DIAGNOSIS — Z79.899: ICD-10-CM

## 2018-06-12 DIAGNOSIS — L89.612: ICD-10-CM

## 2018-06-12 DIAGNOSIS — N17.0: ICD-10-CM

## 2018-06-12 DIAGNOSIS — D68.69: ICD-10-CM

## 2018-06-12 DIAGNOSIS — L97.419: ICD-10-CM

## 2018-06-12 DIAGNOSIS — E03.9: ICD-10-CM

## 2018-06-12 DIAGNOSIS — K56.41: ICD-10-CM

## 2018-06-12 DIAGNOSIS — I48.91: ICD-10-CM

## 2018-06-12 DIAGNOSIS — Z95.5: ICD-10-CM

## 2018-06-12 LAB
ALBUMIN FLD-MCNC: 3 G/DL (ref 3.4–5)
AMYLASE SERPL-CCNC: 31 U/L (ref 25–115)
ANION GAP SERPL CALCULATED.3IONS-SCNC: 16.7 MMOL/L (ref 8–16)
APPEARANCE UR: CLEAR
AST SERPL-CCNC: 15 U/L (ref 15–37)
BASOPHILS # BLD AUTO: 0.1 K/UL (ref 0–0.22)
BASOPHILS NFR BLD AUTO: 0.8 % (ref 0–2)
BILIRUB SERPL-MCNC: 0.2 MG/DL (ref 0–1)
BILIRUB UR QL STRIP: NEGATIVE
BUN SERPL-MCNC: 39 MG/DL (ref 7–18)
CHLORIDE SERPL-SCNC: 102 MMOL/L (ref 98–107)
CHOLEST/HDLC SERPL: 2.6 {RATIO} (ref 1–4.5)
CO2 SERPL-SCNC: 24.6 MMOL/L (ref 21–32)
COLOR UR: YELLOW
CREAT SERPL-MCNC: 0.9 MG/DL (ref 0.6–1.3)
EOSINOPHIL # BLD AUTO: 0.1 K/UL (ref 0–0.4)
EOSINOPHIL NFR BLD AUTO: 1 % (ref 0–4)
ERYTHROCYTE [DISTWIDTH] IN BLOOD BY AUTOMATED COUNT: 18.8 % (ref 11.6–13.7)
GFR SERPL CREATININE-BSD FRML MDRD: (no result) ML/MIN (ref 90–?)
GLUCOSE SERPL-MCNC: 148 MG/DL (ref 74–106)
GLUCOSE UR STRIP-MCNC: NEGATIVE MG/DL
HCT VFR BLD AUTO: 20.1 % (ref 36–48)
HDLC SERPL-MCNC: 29 MG/DL (ref 40–60)
HGB BLD-MCNC: 5.8 G/DL (ref 12–16)
HGB UR QL STRIP: NEGATIVE
IRON SERPL-MCNC: 25 UG/DL (ref 35–150)
LDLC SERPL CALC-MCNC: 18 MG/DL (ref 60–100)
LEUKOCYTE ESTERASE UR QL STRIP: NEGATIVE
LIPASE SERPL-CCNC: 246 U/L (ref 73–393)
LYMPHOCYTES # BLD AUTO: 1.4 K/UL (ref 2.5–16.5)
LYMPHOCYTES NFR BLD AUTO: 18.3 % (ref 20.5–51.1)
MAGNESIUM SERPL-MCNC: 2.1 MG/DL (ref 1.8–2.4)
MCH RBC QN AUTO: 24 PG (ref 27–31)
MCHC RBC AUTO-ENTMCNC: 29 G/DL (ref 33–37)
MCV RBC AUTO: 83.3 FL (ref 80–94)
MONOCYTES # BLD AUTO: 0.8 K/UL (ref 0.8–1)
MONOCYTES NFR BLD AUTO: 10.2 % (ref 1.7–9.3)
NEUTROPHILS # BLD AUTO: 5.2 K/UL (ref 1.8–7.7)
NEUTROPHILS NFR BLD AUTO: 69.7 % (ref 42.2–75.2)
NITRITE UR QL STRIP: NEGATIVE
PH UR STRIP: 6.5 [PH] (ref 5–9)
PHOSPHATE SERPL-MCNC: 2.7 MG/DL (ref 2.5–4.9)
PLATELET # BLD AUTO: 268 K/UL (ref 140–450)
POTASSIUM SERPL-SCNC: 4.3 MMOL/L (ref 3.5–5.1)
PROTHROMBIN TIME: 10.8 SECS (ref 10.8–13.4)
RBC # BLD AUTO: 2.41 MIL/UL (ref 4.2–5.4)
SODIUM SERPL-SCNC: 139 MMOL/L (ref 136–145)
T4 FREE SERPL-MCNC: 1.27 NG/DL (ref 0.76–1.46)
TIBC SERPL-MCNC: 519 UG/DL (ref 250–450)
TRIGL SERPL-MCNC: 139 MG/DL (ref 30–150)
TSH SERPL DL<=0.05 MIU/L-ACNC: 5.11 UIU/ML (ref 0.34–3.74)
WBC # BLD AUTO: 7.5 K/UL (ref 4.8–10.8)

## 2018-06-12 PROCEDURE — P9016 RBC LEUKOCYTES REDUCED: HCPCS

## 2018-06-12 PROCEDURE — A6248 HYDROGEL DRSG GEL FILLER: HCPCS

## 2018-06-12 PROCEDURE — 30233N1 TRANSFUSION OF NONAUTOLOGOUS RED BLOOD CELLS INTO PERIPHERAL VEIN, PERCUTANEOUS APPROACH: ICD-10-PCS | Performed by: GENERAL PRACTICE

## 2018-06-12 RX ADMIN — DEXTROSE AND SODIUM CHLORIDE SCH MLS/HR: 5; .9 INJECTION, SOLUTION INTRAVENOUS at 22:00

## 2018-06-13 VITALS — SYSTOLIC BLOOD PRESSURE: 110 MMHG | DIASTOLIC BLOOD PRESSURE: 34 MMHG

## 2018-06-13 VITALS — DIASTOLIC BLOOD PRESSURE: 48 MMHG | SYSTOLIC BLOOD PRESSURE: 111 MMHG

## 2018-06-13 VITALS — SYSTOLIC BLOOD PRESSURE: 107 MMHG | DIASTOLIC BLOOD PRESSURE: 43 MMHG

## 2018-06-13 VITALS — DIASTOLIC BLOOD PRESSURE: 54 MMHG | SYSTOLIC BLOOD PRESSURE: 136 MMHG

## 2018-06-13 VITALS — SYSTOLIC BLOOD PRESSURE: 118 MMHG | DIASTOLIC BLOOD PRESSURE: 44 MMHG

## 2018-06-13 VITALS — SYSTOLIC BLOOD PRESSURE: 106 MMHG | DIASTOLIC BLOOD PRESSURE: 48 MMHG

## 2018-06-13 LAB
ANION GAP SERPL CALCULATED.3IONS-SCNC: 12.4 MMOL/L (ref 8–16)
BASOPHILS # BLD AUTO: 0.1 K/UL (ref 0–0.22)
BASOPHILS NFR BLD AUTO: 1.1 % (ref 0–2)
BUN SERPL-MCNC: 34 MG/DL (ref 7–18)
CHLORIDE SERPL-SCNC: 108 MMOL/L (ref 98–107)
CO2 SERPL-SCNC: 24.4 MMOL/L (ref 21–32)
CREAT SERPL-MCNC: 0.7 MG/DL (ref 0.6–1.3)
EOSINOPHIL # BLD AUTO: 0.1 K/UL (ref 0–0.4)
EOSINOPHIL NFR BLD AUTO: 2 % (ref 0–4)
ERYTHROCYTE [DISTWIDTH] IN BLOOD BY AUTOMATED COUNT: 15.6 % (ref 11.6–13.7)
GFR SERPL CREATININE-BSD FRML MDRD: (no result) ML/MIN (ref 90–?)
GLUCOSE SERPL-MCNC: 145 MG/DL (ref 74–106)
HCT VFR BLD AUTO: 25.5 % (ref 36–48)
HGB BLD-MCNC: 8.2 G/DL (ref 12–16)
LYMPHOCYTES # BLD AUTO: 1 K/UL (ref 2.5–16.5)
LYMPHOCYTES NFR BLD AUTO: 17.4 % (ref 20.5–51.1)
MAGNESIUM SERPL-MCNC: 1.9 MG/DL (ref 1.8–2.4)
MCH RBC QN AUTO: 27 PG (ref 27–31)
MCHC RBC AUTO-ENTMCNC: 32 G/DL (ref 33–37)
MCV RBC AUTO: 83.5 FL (ref 80–94)
MONOCYTES # BLD AUTO: 0.7 K/UL (ref 0.8–1)
MONOCYTES NFR BLD AUTO: 11.7 % (ref 1.7–9.3)
NEUTROPHILS # BLD AUTO: 3.9 K/UL (ref 1.8–7.7)
NEUTROPHILS NFR BLD AUTO: 67.8 % (ref 42.2–75.2)
PHOSPHATE SERPL-MCNC: 3.1 MG/DL (ref 2.5–4.9)
PLATELET # BLD AUTO: 193 K/UL (ref 140–450)
POTASSIUM SERPL-SCNC: 3.8 MMOL/L (ref 3.5–5.1)
RBC # BLD AUTO: 3.06 MIL/UL (ref 4.2–5.4)
SODIUM SERPL-SCNC: 141 MMOL/L (ref 136–145)
T3RU NFR SERPL: 26 % (ref 24–39)
T4 SERPL-MCNC: 9.8 UG/DL (ref 4.5–12)
WBC # BLD AUTO: 5.7 K/UL (ref 4.8–10.8)

## 2018-06-13 PROCEDURE — 30233N1 TRANSFUSION OF NONAUTOLOGOUS RED BLOOD CELLS INTO PERIPHERAL VEIN, PERCUTANEOUS APPROACH: ICD-10-PCS | Performed by: GENERAL PRACTICE

## 2018-06-13 PROCEDURE — 0DB98ZX EXCISION OF DUODENUM, VIA NATURAL OR ARTIFICIAL OPENING ENDOSCOPIC, DIAGNOSTIC: ICD-10-PCS

## 2018-06-13 RX ADMIN — DEXTROSE AND SODIUM CHLORIDE SCH MLS/HR: 5; .9 INJECTION, SOLUTION INTRAVENOUS at 12:18

## 2018-06-13 RX ADMIN — BRIMONIDINE TARTRATE SCH ML: 2 SOLUTION/ DROPS OPHTHALMIC at 21:00

## 2018-06-13 RX ADMIN — INSULIN LISPRO PRN UNITS: 100 INJECTION, SOLUTION INTRAVENOUS; SUBCUTANEOUS at 12:05

## 2018-06-13 RX ADMIN — DORZOLAMIDE HYDROCHLORIDE SCH ML: 20 SOLUTION OPHTHALMIC at 13:00

## 2018-06-13 RX ADMIN — Medication SCH DEV: at 17:23

## 2018-06-13 RX ADMIN — DEXTROSE AND SODIUM CHLORIDE SCH MLS/HR: 5; .9 INJECTION, SOLUTION INTRAVENOUS at 18:47

## 2018-06-13 RX ADMIN — DEXTROSE AND SODIUM CHLORIDE SCH MLS/HR: 5; .9 INJECTION, SOLUTION INTRAVENOUS at 03:17

## 2018-06-13 RX ADMIN — ATORVASTATIN CALCIUM SCH MG: 20 TABLET, FILM COATED ORAL at 21:42

## 2018-06-13 RX ADMIN — MEMANTINE HYDROCHLORIDE SCH MG: 10 TABLET ORAL at 21:43

## 2018-06-13 RX ADMIN — INSULIN LISPRO PRN UNITS: 100 INJECTION, SOLUTION INTRAVENOUS; SUBCUTANEOUS at 17:25

## 2018-06-13 RX ADMIN — MEMANTINE HYDROCHLORIDE SCH MG: 10 TABLET ORAL at 09:17

## 2018-06-13 RX ADMIN — LATANOPROST SCH ML: 50 SOLUTION OPHTHALMIC at 12:30

## 2018-06-13 RX ADMIN — LACTULOSE SCH GM: 10 SOLUTION ORAL at 21:43

## 2018-06-13 RX ADMIN — DORZOLAMIDE HYDROCHLORIDE SCH ML: 20 SOLUTION OPHTHALMIC at 17:00

## 2018-06-13 RX ADMIN — Medication SCH DEV: at 12:01

## 2018-06-13 RX ADMIN — SENNOSIDES A AND B SCH MG: 8.6 TABLET, FILM COATED ORAL at 21:42

## 2018-06-13 RX ADMIN — LEVOTHYROXINE SODIUM SCH MG: 75 TABLET ORAL at 05:47

## 2018-06-13 RX ADMIN — OXYCODONE HYDROCHLORIDE AND ACETAMINOPHEN SCH MG: 500 TABLET ORAL at 09:16

## 2018-06-13 RX ADMIN — Medication SCH DEV: at 06:33

## 2018-06-13 RX ADMIN — DONEPEZIL HYDROCHLORIDE SCH MG: 10 TABLET, FILM COATED ORAL at 21:43

## 2018-06-13 RX ADMIN — Medication SCH DEV: at 21:41

## 2018-06-14 VITALS — SYSTOLIC BLOOD PRESSURE: 118 MMHG | DIASTOLIC BLOOD PRESSURE: 70 MMHG

## 2018-06-14 VITALS — SYSTOLIC BLOOD PRESSURE: 121 MMHG | DIASTOLIC BLOOD PRESSURE: 43 MMHG

## 2018-06-14 VITALS — SYSTOLIC BLOOD PRESSURE: 127 MMHG | DIASTOLIC BLOOD PRESSURE: 47 MMHG

## 2018-06-14 VITALS — SYSTOLIC BLOOD PRESSURE: 136 MMHG | DIASTOLIC BLOOD PRESSURE: 56 MMHG

## 2018-06-14 LAB
ANION GAP SERPL CALCULATED.3IONS-SCNC: 12.6 MMOL/L (ref 8–16)
BASOPHILS # BLD AUTO: 0.1 K/UL (ref 0–0.22)
BASOPHILS NFR BLD AUTO: 0.7 % (ref 0–2)
BUN SERPL-MCNC: 12 MG/DL (ref 7–18)
CHLORIDE SERPL-SCNC: 105 MMOL/L (ref 98–107)
CO2 SERPL-SCNC: 25.9 MMOL/L (ref 21–32)
CREAT SERPL-MCNC: 0.6 MG/DL (ref 0.6–1.3)
EOSINOPHIL # BLD AUTO: 0.1 K/UL (ref 0–0.4)
EOSINOPHIL NFR BLD AUTO: 0.8 % (ref 0–4)
ERYTHROCYTE [DISTWIDTH] IN BLOOD BY AUTOMATED COUNT: 16.6 % (ref 11.6–13.7)
FOLATE SERPL-MCNC: > 20 NG/ML (ref 3–?)
GFR SERPL CREATININE-BSD FRML MDRD: (no result) ML/MIN (ref 90–?)
GLUCOSE SERPL-MCNC: 210 MG/DL (ref 74–106)
HCT VFR BLD AUTO: 30.7 % (ref 36–48)
HGB BLD-MCNC: 9.9 G/DL (ref 12–16)
LYMPHOCYTES # BLD AUTO: 0.9 K/UL (ref 2.5–16.5)
LYMPHOCYTES NFR BLD AUTO: 11.2 % (ref 20.5–51.1)
MCH RBC QN AUTO: 27 PG (ref 27–31)
MCHC RBC AUTO-ENTMCNC: 32 G/DL (ref 33–37)
MCV RBC AUTO: 82.2 FL (ref 80–94)
MONOCYTES # BLD AUTO: 0.7 K/UL (ref 0.8–1)
MONOCYTES NFR BLD AUTO: 8.7 % (ref 1.7–9.3)
NEUTROPHILS # BLD AUTO: 6.6 K/UL (ref 1.8–7.7)
NEUTROPHILS NFR BLD AUTO: 78.6 % (ref 42.2–75.2)
PLATELET # BLD AUTO: 228 K/UL (ref 140–450)
POTASSIUM SERPL-SCNC: 3.5 MMOL/L (ref 3.5–5.1)
RBC # BLD AUTO: 3.74 MIL/UL (ref 4.2–5.4)
SODIUM SERPL-SCNC: 140 MMOL/L (ref 136–145)
TRANSFERRIN SERPL-MCNC: 332 MG/DL (ref 200–370)
VIT B12 SERPL-MCNC: 259 PG/ML (ref 232–1245)
WBC # BLD AUTO: 8.3 K/UL (ref 4.8–10.8)

## 2018-06-14 RX ADMIN — SODIUM FERRIC GLUCONATE COMPLEX SCH MLS/HR: 12.5 INJECTION INTRAVENOUS at 09:57

## 2018-06-14 RX ADMIN — MUPIROCIN SCH GM: 20 OINTMENT TOPICAL at 13:08

## 2018-06-14 RX ADMIN — DORZOLAMIDE HYDROCHLORIDE SCH ML: 20 SOLUTION OPHTHALMIC at 09:00

## 2018-06-14 RX ADMIN — INSULIN LISPRO PRN UNITS: 100 INJECTION, SOLUTION INTRAVENOUS; SUBCUTANEOUS at 20:51

## 2018-06-14 RX ADMIN — DORZOLAMIDE HYDROCHLORIDE SCH ML: 20 SOLUTION OPHTHALMIC at 10:04

## 2018-06-14 RX ADMIN — INSULIN LISPRO PRN UNITS: 100 INJECTION, SOLUTION INTRAVENOUS; SUBCUTANEOUS at 07:47

## 2018-06-14 RX ADMIN — LEVOTHYROXINE SODIUM SCH MG: 75 TABLET ORAL at 06:17

## 2018-06-14 RX ADMIN — ATORVASTATIN CALCIUM SCH MG: 20 TABLET, FILM COATED ORAL at 20:39

## 2018-06-14 RX ADMIN — Medication SCH DEV: at 21:32

## 2018-06-14 RX ADMIN — MEMANTINE HYDROCHLORIDE SCH MG: 10 TABLET ORAL at 09:57

## 2018-06-14 RX ADMIN — Medication SCH GEL: at 20:41

## 2018-06-14 RX ADMIN — DORZOLAMIDE HYDROCHLORIDE SCH ML: 20 SOLUTION OPHTHALMIC at 17:00

## 2018-06-14 RX ADMIN — BRIMONIDINE TARTRATE SCH ML: 2 SOLUTION/ DROPS OPHTHALMIC at 20:40

## 2018-06-14 RX ADMIN — SENNOSIDES A AND B SCH MG: 8.6 TABLET, FILM COATED ORAL at 09:00

## 2018-06-14 RX ADMIN — SODIUM FERRIC GLUCONATE COMPLEX SCH MLS/HR: 12.5 INJECTION INTRAVENOUS at 20:40

## 2018-06-14 RX ADMIN — DONEPEZIL HYDROCHLORIDE SCH MG: 10 TABLET, FILM COATED ORAL at 20:38

## 2018-06-14 RX ADMIN — BRIMONIDINE TARTRATE SCH ML: 2 SOLUTION/ DROPS OPHTHALMIC at 09:00

## 2018-06-14 RX ADMIN — MULTIVITAMIN TABLET SCH TAB: TABLET at 09:58

## 2018-06-14 RX ADMIN — SODIUM CHLORIDE SCH MLS/HR: 9 INJECTION, SOLUTION INTRAVENOUS at 16:10

## 2018-06-14 RX ADMIN — LACTULOSE SCH GM: 10 SOLUTION ORAL at 09:00

## 2018-06-14 RX ADMIN — Medication SCH DEV: at 16:58

## 2018-06-14 RX ADMIN — LATANOPROST SCH ML: 50 SOLUTION OPHTHALMIC at 10:09

## 2018-06-14 RX ADMIN — Medication SCH DEV: at 11:30

## 2018-06-14 RX ADMIN — Medication SCH DEV: at 06:24

## 2018-06-14 RX ADMIN — SENNOSIDES A AND B SCH MG: 8.6 TABLET, FILM COATED ORAL at 20:38

## 2018-06-14 RX ADMIN — INSULIN LISPRO PRN UNITS: 100 INJECTION, SOLUTION INTRAVENOUS; SUBCUTANEOUS at 16:59

## 2018-06-14 RX ADMIN — MEMANTINE HYDROCHLORIDE SCH MG: 10 TABLET ORAL at 20:39

## 2018-06-14 RX ADMIN — OXYCODONE HYDROCHLORIDE AND ACETAMINOPHEN SCH MG: 500 TABLET ORAL at 09:58

## 2018-06-14 RX ADMIN — CHLORHEXIDINE GLUCONATE SCH EA: 2 SOLUTION TOPICAL at 13:01

## 2018-06-14 RX ADMIN — BRIMONIDINE TARTRATE SCH ML: 2 SOLUTION/ DROPS OPHTHALMIC at 09:57

## 2018-06-14 RX ADMIN — DORZOLAMIDE HYDROCHLORIDE SCH ML: 20 SOLUTION OPHTHALMIC at 13:01

## 2018-06-14 RX ADMIN — INSULIN LISPRO PRN UNITS: 100 INJECTION, SOLUTION INTRAVENOUS; SUBCUTANEOUS at 13:02

## 2018-06-15 VITALS — SYSTOLIC BLOOD PRESSURE: 127 MMHG | DIASTOLIC BLOOD PRESSURE: 58 MMHG

## 2018-06-15 VITALS — DIASTOLIC BLOOD PRESSURE: 48 MMHG | SYSTOLIC BLOOD PRESSURE: 122 MMHG

## 2018-06-15 VITALS — SYSTOLIC BLOOD PRESSURE: 123 MMHG | DIASTOLIC BLOOD PRESSURE: 60 MMHG

## 2018-06-15 VITALS — SYSTOLIC BLOOD PRESSURE: 122 MMHG | DIASTOLIC BLOOD PRESSURE: 51 MMHG

## 2018-06-15 LAB
ANION GAP SERPL CALCULATED.3IONS-SCNC: 12.6 MMOL/L (ref 8–16)
BASOPHILS # BLD AUTO: 0.1 K/UL (ref 0–0.22)
BASOPHILS NFR BLD AUTO: 1.1 % (ref 0–2)
BUN SERPL-MCNC: 7 MG/DL (ref 7–18)
CHLORIDE SERPL-SCNC: 106 MMOL/L (ref 98–107)
CO2 SERPL-SCNC: 24.8 MMOL/L (ref 21–32)
CREAT SERPL-MCNC: 0.5 MG/DL (ref 0.6–1.3)
EOSINOPHIL # BLD AUTO: 0.1 K/UL (ref 0–0.4)
EOSINOPHIL NFR BLD AUTO: 1.3 % (ref 0–4)
ERYTHROCYTE [DISTWIDTH] IN BLOOD BY AUTOMATED COUNT: 16.8 % (ref 11.6–13.7)
GFR SERPL CREATININE-BSD FRML MDRD: (no result) ML/MIN (ref 90–?)
GLUCOSE SERPL-MCNC: 237 MG/DL (ref 74–106)
HCT VFR BLD AUTO: 26.6 % (ref 36–48)
HGB BLD-MCNC: 8.8 G/DL (ref 12–16)
LYMPHOCYTES # BLD AUTO: 1.1 K/UL (ref 2.5–16.5)
LYMPHOCYTES NFR BLD AUTO: 12.2 % (ref 20.5–51.1)
MCH RBC QN AUTO: 27 PG (ref 27–31)
MCHC RBC AUTO-ENTMCNC: 33 G/DL (ref 33–37)
MCV RBC AUTO: 82.8 FL (ref 80–94)
MONOCYTES # BLD AUTO: 0.7 K/UL (ref 0.8–1)
MONOCYTES NFR BLD AUTO: 7.1 % (ref 1.7–9.3)
NEUTROPHILS # BLD AUTO: 7.3 K/UL (ref 1.8–7.7)
NEUTROPHILS NFR BLD AUTO: 78.3 % (ref 42.2–75.2)
PLATELET # BLD AUTO: 220 K/UL (ref 140–450)
POTASSIUM SERPL-SCNC: 3.4 MMOL/L (ref 3.5–5.1)
RBC # BLD AUTO: 3.21 MIL/UL (ref 4.2–5.4)
SODIUM SERPL-SCNC: 140 MMOL/L (ref 136–145)
WBC # BLD AUTO: 9.4 K/UL (ref 4.8–10.8)

## 2018-06-15 RX ADMIN — SENNOSIDES A AND B SCH MG: 8.6 TABLET, FILM COATED ORAL at 20:06

## 2018-06-15 RX ADMIN — SENNOSIDES A AND B SCH MG: 8.6 TABLET, FILM COATED ORAL at 08:25

## 2018-06-15 RX ADMIN — MUPIROCIN SCH GM: 20 OINTMENT TOPICAL at 12:11

## 2018-06-15 RX ADMIN — LACTULOSE SCH GM: 10 SOLUTION ORAL at 08:25

## 2018-06-15 RX ADMIN — DORZOLAMIDE HYDROCHLORIDE SCH ML: 20 SOLUTION OPHTHALMIC at 16:25

## 2018-06-15 RX ADMIN — BRIMONIDINE TARTRATE SCH ML: 2 SOLUTION/ DROPS OPHTHALMIC at 20:06

## 2018-06-15 RX ADMIN — INSULIN LISPRO PRN UNITS: 100 INJECTION, SOLUTION INTRAVENOUS; SUBCUTANEOUS at 06:16

## 2018-06-15 RX ADMIN — INSULIN LISPRO PRN UNITS: 100 INJECTION, SOLUTION INTRAVENOUS; SUBCUTANEOUS at 11:45

## 2018-06-15 RX ADMIN — MEMANTINE HYDROCHLORIDE SCH MG: 10 TABLET ORAL at 20:06

## 2018-06-15 RX ADMIN — SODIUM CHLORIDE SCH MLS/HR: 9 INJECTION, SOLUTION INTRAVENOUS at 21:06

## 2018-06-15 RX ADMIN — Medication SCH GEL: at 08:26

## 2018-06-15 RX ADMIN — OXYCODONE HYDROCHLORIDE AND ACETAMINOPHEN SCH MG: 500 TABLET ORAL at 08:25

## 2018-06-15 RX ADMIN — LEVOTHYROXINE SODIUM SCH MG: 75 TABLET ORAL at 06:09

## 2018-06-15 RX ADMIN — Medication SCH GEL: at 20:07

## 2018-06-15 RX ADMIN — DORZOLAMIDE HYDROCHLORIDE SCH ML: 20 SOLUTION OPHTHALMIC at 12:11

## 2018-06-15 RX ADMIN — SODIUM FERRIC GLUCONATE COMPLEX SCH MLS/HR: 12.5 INJECTION INTRAVENOUS at 08:26

## 2018-06-15 RX ADMIN — BRIMONIDINE TARTRATE SCH ML: 2 SOLUTION/ DROPS OPHTHALMIC at 08:24

## 2018-06-15 RX ADMIN — CHLORHEXIDINE GLUCONATE SCH EA: 2 SOLUTION TOPICAL at 12:11

## 2018-06-15 RX ADMIN — Medication SCH DEV: at 06:09

## 2018-06-15 RX ADMIN — DONEPEZIL HYDROCHLORIDE SCH MG: 10 TABLET, FILM COATED ORAL at 20:06

## 2018-06-15 RX ADMIN — INSULIN LISPRO PRN UNITS: 100 INJECTION, SOLUTION INTRAVENOUS; SUBCUTANEOUS at 16:32

## 2018-06-15 RX ADMIN — DORZOLAMIDE HYDROCHLORIDE SCH ML: 20 SOLUTION OPHTHALMIC at 08:25

## 2018-06-15 RX ADMIN — SODIUM CHLORIDE SCH MLS/HR: 9 INJECTION, SOLUTION INTRAVENOUS at 05:56

## 2018-06-15 RX ADMIN — Medication SCH DEV: at 20:49

## 2018-06-15 RX ADMIN — MEMANTINE HYDROCHLORIDE SCH MG: 10 TABLET ORAL at 08:25

## 2018-06-15 RX ADMIN — ATORVASTATIN CALCIUM SCH MG: 20 TABLET, FILM COATED ORAL at 20:06

## 2018-06-15 RX ADMIN — MULTIVITAMIN TABLET SCH TAB: TABLET at 08:25

## 2018-06-15 RX ADMIN — LATANOPROST SCH ML: 50 SOLUTION OPHTHALMIC at 08:24

## 2018-06-15 RX ADMIN — INSULIN LISPRO PRN UNITS: 100 INJECTION, SOLUTION INTRAVENOUS; SUBCUTANEOUS at 20:16

## 2018-06-15 RX ADMIN — Medication SCH DEV: at 11:43

## 2018-06-15 RX ADMIN — Medication SCH DEV: at 16:25

## 2018-06-16 VITALS — DIASTOLIC BLOOD PRESSURE: 60 MMHG | SYSTOLIC BLOOD PRESSURE: 149 MMHG

## 2018-06-16 VITALS — SYSTOLIC BLOOD PRESSURE: 136 MMHG | DIASTOLIC BLOOD PRESSURE: 57 MMHG

## 2018-06-16 VITALS — DIASTOLIC BLOOD PRESSURE: 53 MMHG | SYSTOLIC BLOOD PRESSURE: 136 MMHG

## 2018-06-16 LAB
ANION GAP SERPL CALCULATED.3IONS-SCNC: 16.4 MMOL/L (ref 8–16)
BASOPHILS # BLD AUTO: 0.1 K/UL (ref 0–0.22)
BASOPHILS NFR BLD AUTO: 0.9 % (ref 0–2)
BUN SERPL-MCNC: 8 MG/DL (ref 7–18)
CHLORIDE SERPL-SCNC: 107 MMOL/L (ref 98–107)
CO2 SERPL-SCNC: 23.3 MMOL/L (ref 21–32)
CREAT SERPL-MCNC: 0.6 MG/DL (ref 0.6–1.3)
EOSINOPHIL # BLD AUTO: 0.2 K/UL (ref 0–0.4)
EOSINOPHIL NFR BLD AUTO: 2.4 % (ref 0–4)
ERYTHROCYTE [DISTWIDTH] IN BLOOD BY AUTOMATED COUNT: 17.2 % (ref 11.6–13.7)
GFR SERPL CREATININE-BSD FRML MDRD: (no result) ML/MIN (ref 90–?)
GLUCOSE SERPL-MCNC: 208 MG/DL (ref 74–106)
HCT VFR BLD AUTO: 27.1 % (ref 36–48)
HGB BLD-MCNC: 8.8 G/DL (ref 12–16)
LYMPHOCYTES # BLD AUTO: 0.9 K/UL (ref 2.5–16.5)
LYMPHOCYTES NFR BLD AUTO: 11.9 % (ref 20.5–51.1)
MCH RBC QN AUTO: 27 PG (ref 27–31)
MCHC RBC AUTO-ENTMCNC: 32 G/DL (ref 33–37)
MCV RBC AUTO: 84.5 FL (ref 80–94)
MONOCYTES # BLD AUTO: 0.6 K/UL (ref 0.8–1)
MONOCYTES NFR BLD AUTO: 8.3 % (ref 1.7–9.3)
NEUTROPHILS # BLD AUTO: 5.7 K/UL (ref 1.8–7.7)
NEUTROPHILS NFR BLD AUTO: 76.5 % (ref 42.2–75.2)
PLATELET # BLD AUTO: 217 K/UL (ref 140–450)
POTASSIUM SERPL-SCNC: 4.7 MMOL/L (ref 3.5–5.1)
RBC # BLD AUTO: 3.21 MIL/UL (ref 4.2–5.4)
SODIUM SERPL-SCNC: 142 MMOL/L (ref 136–145)
WBC # BLD AUTO: 7.4 K/UL (ref 4.8–10.8)

## 2018-06-16 RX ADMIN — BRIMONIDINE TARTRATE SCH ML: 2 SOLUTION/ DROPS OPHTHALMIC at 20:54

## 2018-06-16 RX ADMIN — LEVOTHYROXINE SODIUM SCH MG: 75 TABLET ORAL at 05:53

## 2018-06-16 RX ADMIN — Medication SCH DEV: at 06:34

## 2018-06-16 RX ADMIN — DORZOLAMIDE HYDROCHLORIDE SCH ML: 20 SOLUTION OPHTHALMIC at 13:00

## 2018-06-16 RX ADMIN — Medication SCH DEV: at 16:30

## 2018-06-16 RX ADMIN — DORZOLAMIDE HYDROCHLORIDE SCH ML: 20 SOLUTION OPHTHALMIC at 17:00

## 2018-06-16 RX ADMIN — SODIUM CHLORIDE SCH MLS/HR: 9 INJECTION, SOLUTION INTRAVENOUS at 17:44

## 2018-06-16 RX ADMIN — DONEPEZIL HYDROCHLORIDE SCH MG: 10 TABLET, FILM COATED ORAL at 20:54

## 2018-06-16 RX ADMIN — Medication SCH DEV: at 21:05

## 2018-06-16 RX ADMIN — LACTULOSE SCH GM: 10 SOLUTION ORAL at 09:30

## 2018-06-16 RX ADMIN — BRIMONIDINE TARTRATE SCH ML: 2 SOLUTION/ DROPS OPHTHALMIC at 10:00

## 2018-06-16 RX ADMIN — OXYCODONE HYDROCHLORIDE AND ACETAMINOPHEN SCH MG: 500 TABLET ORAL at 09:31

## 2018-06-16 RX ADMIN — ATORVASTATIN CALCIUM SCH MG: 20 TABLET, FILM COATED ORAL at 20:53

## 2018-06-16 RX ADMIN — MULTIVITAMIN TABLET SCH TAB: TABLET at 09:32

## 2018-06-16 RX ADMIN — INSULIN LISPRO PRN UNITS: 100 INJECTION, SOLUTION INTRAVENOUS; SUBCUTANEOUS at 18:09

## 2018-06-16 RX ADMIN — INSULIN LISPRO PRN UNITS: 100 INJECTION, SOLUTION INTRAVENOUS; SUBCUTANEOUS at 21:04

## 2018-06-16 RX ADMIN — INSULIN LISPRO PRN UNITS: 100 INJECTION, SOLUTION INTRAVENOUS; SUBCUTANEOUS at 05:56

## 2018-06-16 RX ADMIN — MEMANTINE HYDROCHLORIDE SCH MG: 10 TABLET ORAL at 20:54

## 2018-06-16 RX ADMIN — LATANOPROST SCH ML: 50 SOLUTION OPHTHALMIC at 09:00

## 2018-06-16 RX ADMIN — DORZOLAMIDE HYDROCHLORIDE SCH ML: 20 SOLUTION OPHTHALMIC at 09:50

## 2018-06-16 RX ADMIN — MEMANTINE HYDROCHLORIDE SCH MG: 10 TABLET ORAL at 09:31

## 2018-06-16 RX ADMIN — Medication SCH GEL: at 10:10

## 2018-06-16 RX ADMIN — SENNOSIDES A AND B SCH MG: 8.6 TABLET, FILM COATED ORAL at 20:54

## 2018-06-16 RX ADMIN — CHLORHEXIDINE GLUCONATE SCH EA: 2 SOLUTION TOPICAL at 13:00

## 2018-06-16 RX ADMIN — SENNOSIDES A AND B SCH MG: 8.6 TABLET, FILM COATED ORAL at 09:31

## 2018-06-16 RX ADMIN — MUPIROCIN SCH GM: 20 OINTMENT TOPICAL at 13:00

## 2018-06-16 RX ADMIN — Medication SCH DEV: at 12:10

## 2018-06-16 RX ADMIN — Medication SCH GEL: at 21:04

## 2018-06-16 RX ADMIN — INSULIN LISPRO PRN UNITS: 100 INJECTION, SOLUTION INTRAVENOUS; SUBCUTANEOUS at 12:14
